# Patient Record
Sex: FEMALE | Race: WHITE | Employment: UNEMPLOYED | URBAN - METROPOLITAN AREA
[De-identification: names, ages, dates, MRNs, and addresses within clinical notes are randomized per-mention and may not be internally consistent; named-entity substitution may affect disease eponyms.]

---

## 2024-04-01 ENCOUNTER — OFFICE VISIT (OUTPATIENT)
Dept: URGENT CARE | Facility: CLINIC | Age: 44
End: 2024-04-01
Payer: COMMERCIAL

## 2024-04-01 VITALS
SYSTOLIC BLOOD PRESSURE: 121 MMHG | HEART RATE: 72 BPM | OXYGEN SATURATION: 98 % | RESPIRATION RATE: 18 BRPM | DIASTOLIC BLOOD PRESSURE: 82 MMHG | TEMPERATURE: 98.5 F

## 2024-04-01 DIAGNOSIS — N64.52 BREAST DISCHARGE: Primary | ICD-10-CM

## 2024-04-01 PROCEDURE — 87205 SMEAR GRAM STAIN: CPT

## 2024-04-01 PROCEDURE — 87070 CULTURE OTHR SPECIMN AEROBIC: CPT

## 2024-04-01 PROCEDURE — 99213 OFFICE O/P EST LOW 20 MIN: CPT | Performed by: NURSE PRACTITIONER

## 2024-04-01 PROCEDURE — 87185 SC STD ENZYME DETCJ PER NZM: CPT

## 2024-04-01 PROCEDURE — 87075 CULTR BACTERIA EXCEPT BLOOD: CPT

## 2024-04-01 RX ORDER — SULFAMETHOXAZOLE AND TRIMETHOPRIM 800; 160 MG/1; MG/1
1 TABLET ORAL 2 TIMES DAILY
Qty: 20 TABLET | Refills: 0 | Status: SHIPPED | OUTPATIENT
Start: 2024-04-01 | End: 2024-04-08 | Stop reason: HOSPADM

## 2024-04-01 NOTE — PROGRESS NOTES
Subjective   Patient ID: Mayelin Cook is a 43 y.o. female.    Patient presents with complaints of right nipple discharge for over a year she has seen her PCP, GYN and specialist they informed her they thought it was extra skin coming out of the areola and gave her 2 mammograms which were normal but no antibiotics, no other wound cultures or anything were completed.  Patient did of note have a bacterial I&D for staph infection to the outer right breast years ago.  That is healed.  Denies any fever, chills, chest pain, shortness breath, nausea or vomiting.          The following portions of the chart were reviewed this encounter and updated as appropriate:         Review of Systems   All other systems reviewed and are negative.    Objective   Physical Exam  Vitals and nursing note reviewed.   Constitutional:       General: She is not in acute distress.     Appearance: Normal appearance. She is not toxic-appearing.   HENT:      Head: Normocephalic.      Right Ear: External ear normal.      Left Ear: External ear normal.      Nose: Nose normal.      Mouth/Throat:      Mouth: Mucous membranes are moist.      Pharynx: No oropharyngeal exudate or posterior oropharyngeal erythema.   Eyes:      Extraocular Movements: Extraocular movements intact.   Cardiovascular:      Rate and Rhythm: Normal rate and regular rhythm.      Heart sounds: Normal heart sounds.   Pulmonary:      Effort: Pulmonary effort is normal.      Breath sounds: Normal breath sounds. No wheezing.   Chest:      Comments: Lolri female chaperone in room at all times during examination.  The right breast does seem asymmetrical as opposed to the left, there is no erythema, there is no warmth on palpation.  Patient was able to express a white discharge from the nipple area this was cultured  Musculoskeletal:         General: Normal range of motion.      Cervical back: Normal range of motion and neck supple.   Skin:     Capillary Refill: Capillary refill takes less  than 2 seconds.   Neurological:      General: No focal deficit present.      Mental Status: She is alert and oriented to person, place, and time.   Psychiatric:         Mood and Affect: Mood normal.         Behavior: Behavior normal.         Thought Content: Thought content normal.       Procedures    Assessment/Plan   Diagnoses and all orders for this visit:  Breast discharge  -     BI US breast complete right; Future  -     sulfamethoxazole-trimethoprim (Bactrim DS) 800-160 mg tablet; Take 1 tablet by mouth 2 times a day for 10 days.  -     Tissue/Wound Culture/Smear  Above plan of care was reviewed with the patient, all questions were answered, through shared decision making the patient agrees with this plan of care.    Red flags for strict return precaution have been reviewed with the patient and or patient gaurdian, patient is alert, oriented and non-toxic appearing, has decision making capabilities and agrees with the plan of care through shared decision making at this time. Current diagnosis, any medication changes, lab or radiologic results have been reviewed with the patient at the time of visit. If symptoms do not improve, or worsen, patient is to follow up with PCP or report to the emergency room.   Patient is alert and oriented x3 vital signs stable nontoxic-appearing and has decision-making capabilities.    Please note that the majority this note was made with Dragon speaking software there may be grammatical errors secondary to the speaking program.      Patient disposition: Home

## 2024-04-04 LAB
B-LACTAMASE ORGANISM ISLT: POSITIVE
BACTERIA SPEC CULT: ABNORMAL
BACTERIA SPEC CULT: ABNORMAL
GRAM STN SPEC: ABNORMAL
GRAM STN SPEC: ABNORMAL

## 2024-04-05 ENCOUNTER — HOSPITAL ENCOUNTER (OUTPATIENT)
Dept: RADIOLOGY | Facility: CLINIC | Age: 44
Discharge: HOME | End: 2024-04-05
Payer: COMMERCIAL

## 2024-04-05 ENCOUNTER — HOSPITAL ENCOUNTER (OUTPATIENT)
Dept: RADIOLOGY | Facility: EXTERNAL LOCATION | Age: 44
Discharge: HOME | End: 2024-04-05

## 2024-04-05 DIAGNOSIS — N64.52 NIPPLE DISCHARGE: ICD-10-CM

## 2024-04-05 DIAGNOSIS — N64.52 BREAST DISCHARGE: ICD-10-CM

## 2024-04-05 PROCEDURE — 76642 ULTRASOUND BREAST LIMITED: CPT | Mod: RIGHT SIDE | Performed by: STUDENT IN AN ORGANIZED HEALTH CARE EDUCATION/TRAINING PROGRAM

## 2024-04-05 PROCEDURE — 76642 ULTRASOUND BREAST LIMITED: CPT | Mod: RT

## 2024-04-05 PROCEDURE — 76982 USE 1ST TARGET LESION: CPT | Mod: RT

## 2024-04-06 NOTE — PROGRESS NOTES
Mayelin Cook female   1980 43 y.o.   [id number]      Chief Complaint    New Patient Visit          [facility]  Mayelin Cook is a 43 y.o.   female referred by Kaushal Sloan from Urgent Care to the [facility] for abnormal breast imaging.  Patient complains of right nipple erythema and swelling with underlying lump and yellow nipple discharge.  She is currently on Bactrim. This morning while exercising she has large amount of yellow fluid come out from near nipple where previous nipple piercing was.  She smoke 2 cigars daily.  She has history of right breast incision and drainage for staph infection several years ago.    BREAST IMAGIN2024 right breast ultrasound, BI-RADS Category 3 right subareolar fluid collection consistent with developing abscess, short-term follow-up ultrasound is recommended. 2024 bilateral screening mammogram at [facility], BI-RADS Category 1,    REPRODUCTIVE HISTORY: menarche age 13, , first birth age 19, perimenopausal, on HRT, scattered breast tissue     FAMILY CANCER HISTORY: None      REVIEW OF SYSTEMS    Constitutional:  Negative for appetite change, fatigue, fever and unexpected weight change.   HENT:  Negative for ear pain, hearing loss, nosebleeds, sore throat and trouble swallowing.    Eyes:  Negative for discharge, itching and visual disturbance.   Respiratory:  Negative for cough, chest tightness and shortness of breath.    Cardiovascular:  Negative for chest pain, palpitations and leg swelling.   Breast: as indicated in HPI  Gastrointestinal:  Negative for abdominal pain, constipation, diarrhea and nausea.   Endocrine: Negative for cold intolerance and heat intolerance.   Genitourinary:  Negative for dysuria, frequency, hematuria, pelvic pain and vaginal bleeding.   Musculoskeletal:  Negative for arthralgias, back pain, gait problem, joint swelling and myalgias.   Skin:  Negative for color change and rash.   Allergic/Immunologic: Negative for environmental  allergies and food allergies.   Neurological:  Negative for dizziness, tremors, speech difficulty, weakness, numbness and headaches.   Hematological:  Does not bruise/bleed easily.   Psychiatric/Behavioral:  Negative for agitation, dysphoric mood and sleep disturbance. The patient is not nervous/anxious.         MEDICATIONS  Current Outpatient Medications   Medication Instructions    acetaminophen (TYLENOL) 1,000 mg, oral, Every 6 hours    doxycycline (VIBRA-TABS) 100 mg, oral, 2 times daily, Take with a full glass of water and do not lie down for at least 30 minutes after.    estradiol (Vivelle-DOT) 0.0375 mg/24 hr 1 patch, transdermal    ibuprofen 600 mg tablet     traZODone (Desyrel) 100 mg tablet 2 tablets, oral, Nightly PRN        ALLERGIES  Allergies   Allergen Reactions    Bee Venom Protein (Honey Bee) Unknown        No past medical history on file.   No past surgical history on file.   No family history on file.       SOCIAL HISTORY      Social History     Tobacco Use    Smoking status: Not on file    Smokeless tobacco: Not on file   Substance Use Topics    Alcohol use: Not on file        VITALS  Vitals:    04/08/24 1008   BP: 110/60   Pulse: 66   Temp: 37 °C (98.6 °F)        PHYSICAL EXAM  Patient is alert and oriented x3, with appropriate mood. The gait is steady and hand grasps are equal. Sclera clear. The breasts are nearly symmetrical. The right breast subareolar is 2cm thickening, to right of nipple is hole from piercing with thick yellow fluid. The right superolateral breast with hole like scar from previous infection. The left tissue is soft without palpable abnormalities, discrete nodules or masses. The skin and nipples appear normal. There is no cervical, supraclavicular, or axillary lymphadenopathy palpable. Heart rate and rhythm normal, S1 and S2 appreciated. The lungs are clear bilaterally. Abdomen is soft & non-tender.    Physical Exam  Chest:              IMAGING      Time was spent viewing  digital images of the radiology testing with the patient. I explained the results in depth, along with suggested explanation for follow up recommendations based on the testing results.          ORDERS  Orders Placed This Encounter   Procedures    BI US breast limited right     Standing Status:   Future     Standing Expiration Date:   6/8/2025     Order Specific Question:   Reason for exam:     Answer:   right     Order Specific Question:   Radiologist to Determine Optimal Study     Answer:   Yes     Order Specific Question:   [facility]     Answer:   Immediate     Order Specific Question:   Is this exam part of a Research Study? If Yes, link this order to the research study     Answer:   No          ASSESSMENT/PLAN  1. Breast infection  doxycycline (Vibra-Tabs) 100 mg tablet    BI US breast limited right    Clinic Appointment Request             Follow up in about 1 month (around 5/8/2024).Stop Bactrim. Start Doxycyline 100mg twice daily, use warm water soaks  3 times daily to right nipple. Stop smoking      Leonor Cortez, APRN-CNP  [facility]

## 2024-04-08 ENCOUNTER — HOSPITAL ENCOUNTER (OUTPATIENT)
Dept: RADIOLOGY | Facility: CLINIC | Age: 44
Discharge: HOME | End: 2024-04-08
Payer: COMMERCIAL

## 2024-04-08 ENCOUNTER — OFFICE VISIT (OUTPATIENT)
Dept: SURGICAL ONCOLOGY | Facility: CLINIC | Age: 44
End: 2024-04-08
Payer: COMMERCIAL

## 2024-04-08 VITALS
DIASTOLIC BLOOD PRESSURE: 60 MMHG | BODY MASS INDEX: 27.23 KG/M2 | SYSTOLIC BLOOD PRESSURE: 110 MMHG | HEART RATE: 66 BPM | HEIGHT: 62 IN | TEMPERATURE: 98.6 F | WEIGHT: 148 LBS

## 2024-04-08 DIAGNOSIS — R92.8 OTHER ABNORMAL AND INCONCLUSIVE FINDINGS ON DIAGNOSTIC IMAGING OF BREAST: ICD-10-CM

## 2024-04-08 DIAGNOSIS — N61.0 BREAST INFECTION: Primary | ICD-10-CM

## 2024-04-08 PROCEDURE — 99203 OFFICE O/P NEW LOW 30 MIN: CPT | Performed by: NURSE PRACTITIONER

## 2024-04-08 PROCEDURE — 99213 OFFICE O/P EST LOW 20 MIN: CPT | Performed by: NURSE PRACTITIONER

## 2024-04-08 RX ORDER — TRAZODONE HYDROCHLORIDE 100 MG/1
2 TABLET ORAL NIGHTLY PRN
COMMUNITY
Start: 2021-11-04

## 2024-04-08 RX ORDER — DOXYCYCLINE HYCLATE 100 MG
100 TABLET ORAL 2 TIMES DAILY
Qty: 28 TABLET | Refills: 0 | Status: SHIPPED | OUTPATIENT
Start: 2024-04-08 | End: 2024-04-22

## 2024-04-08 RX ORDER — ACETAMINOPHEN 500 MG
1000 TABLET ORAL EVERY 6 HOURS
COMMUNITY
Start: 2023-09-19

## 2024-04-08 RX ORDER — IBUPROFEN 600 MG/1
TABLET ORAL
COMMUNITY
Start: 2023-09-19 | End: 2024-05-29 | Stop reason: ALTCHOICE

## 2024-04-08 RX ORDER — ESTRADIOL 0.04 MG/D
1 FILM, EXTENDED RELEASE TRANSDERMAL
COMMUNITY
Start: 2024-02-29 | End: 2025-02-28

## 2024-04-08 ASSESSMENT — PAIN SCALES - GENERAL: PAINLEVEL: 7

## 2024-04-09 ENCOUNTER — LAB (OUTPATIENT)
Dept: LAB | Facility: LAB | Age: 44
End: 2024-04-09
Payer: COMMERCIAL

## 2024-04-09 ENCOUNTER — OFFICE VISIT (OUTPATIENT)
Dept: PRIMARY CARE | Facility: CLINIC | Age: 44
End: 2024-04-09
Payer: COMMERCIAL

## 2024-04-09 VITALS
WEIGHT: 153 LBS | OXYGEN SATURATION: 97 % | HEART RATE: 81 BPM | HEIGHT: 62 IN | TEMPERATURE: 98.2 F | DIASTOLIC BLOOD PRESSURE: 68 MMHG | BODY MASS INDEX: 28.16 KG/M2 | SYSTOLIC BLOOD PRESSURE: 112 MMHG

## 2024-04-09 DIAGNOSIS — R20.2 PARESTHESIA OF ARM: Primary | ICD-10-CM

## 2024-04-09 DIAGNOSIS — R68.89 COLD INTOLERANCE: ICD-10-CM

## 2024-04-09 DIAGNOSIS — N64.52 NIPPLE DISCHARGE: ICD-10-CM

## 2024-04-09 DIAGNOSIS — G56.03 BILATERAL CARPAL TUNNEL SYNDROME: ICD-10-CM

## 2024-04-09 LAB — TSH SERPL-ACNC: 1.26 MIU/L (ref 0.44–3.98)

## 2024-04-09 PROCEDURE — L3908 WHO COCK-UP NONMOLDE PRE OTS: HCPCS | Performed by: PHYSICIAN ASSISTANT

## 2024-04-09 PROCEDURE — 99214 OFFICE O/P EST MOD 30 MIN: CPT | Performed by: FAMILY MEDICINE

## 2024-04-09 PROCEDURE — 36415 COLL VENOUS BLD VENIPUNCTURE: CPT

## 2024-04-09 PROCEDURE — 84443 ASSAY THYROID STIM HORMONE: CPT

## 2024-04-09 NOTE — PROGRESS NOTES
"Subjective   Patient ID: Mayelin Cook is a 43 y.o. female who presents for Establish Care.    HPI     States her hands and bilateral arms fall asleep and the pain wakes her up out of her sleep. The pain is usually from the elbows down, but sometimes can be down the whole arm. 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 Does not happen with jogging, walking - primarily only happens with lying down. She denies any neck or shoulder pain or Hx of injuries to the neck or shoulder. States that her pain was exacerbated by doing heavy-duty bootcamps and powerlifting to work out, but she stopped around 90 days ago and replaced that with yoga and her Sx have been happening less often. She is woken up at least 3 days a week multiple times at night by a tingling pain. States that up until 3 years ago, she was a  \"her whole life.\"     Pt saw Satish Sloan through the [facility] 4/1/24 for nipple discharge. Was referred to breast surgery - saw JUNIE Cortez and is following up with them. They did an US, previously did a mammo in February.     Pt also brought up that her right eye is twitching and is smaller than the other. Has been ongoing x 8-9 months. She was referred to an eye doctor, but then she was told to start at [facility] and did not go. She has not seen an eye doctor. 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 States that she \"is having trouble losing the last 10 lbs to make her at a comfortable weight.\" Has done intermittent fasting, eating healthy, and working out consistently and the weight won't come off. She also admits to cold intolerance. She did have very brittle hair, and has spent a lot of time and money fixing it. No brittle nails. She denies significant fatigue.     Review of Systems   All other systems reviewed and are negative.      Objective   /68   Pulse 81   Temp 36.8 °C (98.2 °F)   Ht 1.575 m (5' " "2\")   Wt 69.4 kg (153 lb)   SpO2 97%   BMI 27.98 kg/m²     Physical Exam  Vitals reviewed.   Constitutional:       General: She is awake.      Appearance: Normal appearance. She is well-developed.   HENT:      Head: Normocephalic and atraumatic.   Cardiovascular:      Rate and Rhythm: Normal rate.   Pulmonary:      Effort: Pulmonary effort is normal.   Musculoskeletal:      Cervical back: Full passive range of motion without pain.      Right lower leg: No edema.      Left lower leg: No edema.   Skin:     General: Skin is warm and dry.      Findings: No lesion or rash.   Neurological:      General: No focal deficit present.      Mental Status: She is alert and oriented to person, place, and time.      Cranial Nerves: No facial asymmetry.      Motor: Motor function is intact.      Gait: Gait is intact.   Psychiatric:         Attention and Perception: Attention normal.         Mood and Affect: Mood and affect normal.         Assessment/Plan   Diagnoses and all orders for this visit:  Cold intolerance  -     TSH with reflex to Free T4 if abnormal; Future      - 2 wrist extension splints provided for pt today to wear at night   - TSH drawn due to eye concerns per pt request, though she was previously told by other PCP that this would generally be a bilateral issue and not unilateral. Last TSH >1 year ago and was normal; however, this was prior to Sx starting. Will consider referral to [facility] if normal for further evaluation.   - Continue follow up with breast surgeon regarding nipple discharge.  - Pt to follow up if no improvement with 1 month night splints or if worsening and when she needs medication refills       "

## 2024-05-08 ENCOUNTER — APPOINTMENT (OUTPATIENT)
Dept: PRIMARY CARE | Facility: CLINIC | Age: 44
End: 2024-05-08
Payer: COMMERCIAL

## 2024-05-09 NOTE — PROGRESS NOTES
Mayelin Cook female   1980 43 y.o.   [id number]      Chief Complaint    Follow-up            [facility]  Mayelin Cook is a 43 y.o.   female [facility] the [facility]  in follow up for right breast infection. Patient had complaints of right nipple erythema and swelling with underlying lump and yellow nipple discharge.  She was on Bactrim then doxycycline. She has near.  She smoke 2 cigars daily.  She has history of right breast incision and drainage for staph infection several years ago.    BREAST IMAGIN2024 right breast ultrasound, BI-RADS Category 3 right subareolar fluid collection consistent with developing abscess, short-term follow-up ultrasound is recommended. 2024 bilateral screening mammogram at [place], BI-RADS Category 1.    REPRODUCTIVE HISTORY: menarche age 13, , first birth age 19, perimenopausal, on HRT, scattered breast tissue     FAMILY CANCER HISTORY: None      REVIEW OF SYSTEMS    Constitutional:  Negative for appetite change, fatigue, fever and unexpected weight change.   HENT:  Negative for ear pain, hearing loss, nosebleeds, sore throat and trouble swallowing.    Eyes:  Negative for discharge, itching and visual disturbance.   Respiratory:  Negative for cough, chest tightness and shortness of breath.    Cardiovascular:  Negative for chest pain, palpitations and leg swelling.   Breast: as indicated in HPI  Gastrointestinal:  Negative for abdominal pain, constipation, diarrhea and nausea.   Endocrine: Negative for cold intolerance and heat intolerance.   Genitourinary:  Negative for dysuria, frequency, hematuria, pelvic pain and vaginal bleeding.   Musculoskeletal:  Negative for arthralgias, back pain, gait problem, joint swelling and myalgias.   Skin:  Negative for color change and rash.   Allergic/Immunologic: Negative for environmental allergies and food allergies.   Neurological:  Negative for dizziness, tremors, speech difficulty, weakness, numbness and headaches.    Hematological:  Does not bruise/bleed easily.   Psychiatric/Behavioral:  Negative for agitation, dysphoric mood and sleep disturbance. The patient is not nervous/anxious.         MEDICATIONS  Current Outpatient Medications   Medication Instructions    acetaminophen (TYLENOL) 1,000 mg, oral, Every 6 hours    estradiol (Vivelle-DOT) 0.0375 mg/24 hr 1 patch, transdermal    ibuprofen 600 mg tablet     traZODone (Desyrel) 100 mg tablet 2 tablets, oral, Nightly PRN        ALLERGIES  Allergies   Allergen Reactions    Bee Venom Protein (Honey Bee) Unknown        No past medical history on file.   Past Surgical History:   Procedure Laterality Date    ABCESS DRAINAGE      HYSTERECTOMY        Family History   Problem Relation Name Age of Onset    No Known Problems Mother      No Known Problems Father            SOCIAL HISTORY      Social History     Tobacco Use    Smoking status: Every Day     Types: Cigarettes    Smokeless tobacco: Never   Substance Use Topics    Alcohol use: Not on file        VITALS  Vitals:    05/10/24 1031   BP: 121/75   Pulse: 51          PHYSICAL EXAM  Patient is alert and oriented x3, with appropriate mood. The gait is steady and hand grasps are equal. Sclera clear. The breasts are nearly symmetrical. The right breast subareolar scan barely palpable thickening. The right superolateral breast with hole like scar from previous infection. The left tissue is soft without palpable abnormalities, discrete nodules or masses. The skin and nipples appear normal. There is no cervical, supraclavicular, or axillary lymphadenopathy palpable. Heart rate and rhythm normal, S1 and S2 appreciated. The lungs are clear bilaterally. Abdomen is soft & non-tender.    Physical Exam  Chest:              IMAGING  Right breast ultrasound, BI-RADS Category 2, significant improvement.    Time was spent viewing digital images of the radiology testing with the patient. I explained the results in depth, along with suggested  explanation for follow up recommendations based on the testing results.          ORDERS  Orders Placed This Encounter   Procedures    BI mammo bilateral screening tomosynthesis     Standing Status:   Future     Standing Expiration Date:   7/10/2025     Order Specific Question:   Perform a breast ultrasound if clinically indicated by Radiologist?     Answer:   Yes     Order Specific Question:   Previous Mamm performed at  location?     Answer:   Yes     Order Specific Question:   Reason for exam:     Answer:   screening     Order Specific Question:   Radiologist to Determine Optimal Study     Answer:   Yes     Order Specific Question:   Release result to [employer]     Answer:   Immediate     Order Specific Question:   Is this exam part of a Research Study? If Yes, link this order to the research study     Answer:   No     Order Specific Question:   Is the patient pregnant?     Answer:   No          ASSESSMENT/PLAN  1. Healthcare maintenance  BI mammo bilateral screening tomosynthesis      2. Breast infection  Clinic Appointment Request               Follow up Follow up as needed.  Thank you for coming to see me today at [facility]. Your clinical examination and imaging is normal. You no longer need to be seen by a surgical breast specialist. It is important to continue annual screening mammograms & clinical breast exams. Please return to see me if you have a new breast problem or abnormal mammogram. It has been a pleasure having you as a patient.     You can see your health information, review clinical summaries from office visits & test results online when you follow your health with MY  Chart, a personal health record. To sign up go to [email or web address]. If you need assistance with signing up or trouble getting into your account call [employer] Patient Line 24/7 at [phone].     My office phone number is [phone] if you need to get in touch with me or have additional  questions or problems. Thank you for choosing [facility] and trusting me as your healthcare provider. I am honored to be a provider on your health care team and I remain dedicated to helping you achieve your health goals.       JAKUB [facility]

## 2024-05-10 ENCOUNTER — OFFICE VISIT (OUTPATIENT)
Dept: SURGICAL ONCOLOGY | Facility: CLINIC | Age: 44
End: 2024-05-10
Payer: COMMERCIAL

## 2024-05-10 ENCOUNTER — HOSPITAL ENCOUNTER (OUTPATIENT)
Dept: RADIOLOGY | Facility: CLINIC | Age: 44
Discharge: HOME | End: 2024-05-10
Payer: COMMERCIAL

## 2024-05-10 VITALS
HEIGHT: 62 IN | DIASTOLIC BLOOD PRESSURE: 75 MMHG | WEIGHT: 149 LBS | BODY MASS INDEX: 27.42 KG/M2 | HEART RATE: 51 BPM | SYSTOLIC BLOOD PRESSURE: 121 MMHG

## 2024-05-10 DIAGNOSIS — N61.0 BREAST INFECTION: ICD-10-CM

## 2024-05-10 DIAGNOSIS — Z00.00 HEALTHCARE MAINTENANCE: Primary | ICD-10-CM

## 2024-05-10 PROCEDURE — 99213 OFFICE O/P EST LOW 20 MIN: CPT | Performed by: NURSE PRACTITIONER

## 2024-05-10 PROCEDURE — 76982 USE 1ST TARGET LESION: CPT | Mod: RT

## 2024-05-10 PROCEDURE — 76642 ULTRASOUND BREAST LIMITED: CPT | Mod: RT

## 2024-05-10 ASSESSMENT — PAIN SCALES - GENERAL: PAINLEVEL: 0-NO PAIN

## 2024-05-15 DIAGNOSIS — N61.0 BREAST INFECTION: Primary | ICD-10-CM

## 2024-05-15 RX ORDER — DOXYCYCLINE 100 MG/1
100 CAPSULE ORAL 2 TIMES DAILY
Qty: 28 CAPSULE | Refills: 0 | Status: SHIPPED | OUTPATIENT
Start: 2024-05-15 | End: 2024-05-29

## 2024-05-29 ENCOUNTER — OFFICE VISIT (OUTPATIENT)
Dept: PRIMARY CARE | Facility: CLINIC | Age: 44
End: 2024-05-29
Payer: COMMERCIAL

## 2024-05-29 VITALS
DIASTOLIC BLOOD PRESSURE: 60 MMHG | OXYGEN SATURATION: 98 % | HEIGHT: 62 IN | WEIGHT: 157 LBS | BODY MASS INDEX: 28.89 KG/M2 | SYSTOLIC BLOOD PRESSURE: 108 MMHG | HEART RATE: 68 BPM

## 2024-05-29 DIAGNOSIS — M54.32 LEFT SIDED SCIATICA: Primary | ICD-10-CM

## 2024-05-29 PROCEDURE — 99214 OFFICE O/P EST MOD 30 MIN: CPT | Performed by: FAMILY MEDICINE

## 2024-05-29 PROCEDURE — 96372 THER/PROPH/DIAG INJ SC/IM: CPT | Performed by: PHYSICIAN ASSISTANT

## 2024-05-29 RX ORDER — TRIAMCINOLONE ACETONIDE 40 MG/ML
40 INJECTION, SUSPENSION INTRA-ARTICULAR; INTRAMUSCULAR ONCE
Status: COMPLETED | OUTPATIENT
Start: 2024-05-29 | End: 2024-05-29

## 2024-05-29 RX ORDER — GABAPENTIN 300 MG/1
300 CAPSULE ORAL 3 TIMES DAILY PRN
Qty: 42 CAPSULE | Refills: 0 | Status: SHIPPED | OUTPATIENT
Start: 2024-05-29 | End: 2024-06-12

## 2024-05-29 RX ADMIN — TRIAMCINOLONE ACETONIDE 40 MG: 40 INJECTION, SUSPENSION INTRA-ARTICULAR; INTRAMUSCULAR at 13:44

## 2024-05-29 NOTE — PROGRESS NOTES
"Subjective   Patient ID: Mayelin Cook is a 43 y.o. female who presents for Back Pain.    HPI     C/o pain in her right low back radiating into her calf muscle. Patient is unable to fully bend over and when bending over she feels a tearing sensation in her hamstring. Pain is worse when coughing or sneezing. Pain started about six weeks ago. She initially made an appointment a few weeks ago, but then it started to subside so she canceled. Denies any known injury. States she did have muscle spasms in her gluteal muscle one week prior of when the pain started but not sure if related, then slept on a bad mattress in an AirBnB and was wondering if maybe that caused it to be worse. She was trying to continue yoga once per week - usually goes many times per week, but could not get into that. Has received three deep tissues massages. Has also tried Aleve, Motrin, applying hot/cold, Arnicare Gel. She has also been doing sciatic nerve stretches at home, all without relief. She denies any previous Dx of back issues or sciatic nerve issues. Sx are overall worsening. They started to worsen around 2.5 weeks ago. Being in one position too long aggravates her Sx - sitting or standing too long.     Review of Systems   All other systems reviewed and are negative.      Objective   /60   Pulse 68   Ht 1.575 m (5' 2\")   Wt 71.2 kg (157 lb)   SpO2 98%   BMI 28.72 kg/m²     Physical Exam  Vitals reviewed.   Constitutional:       General: She is awake.      Appearance: Normal appearance. She is well-developed.   HENT:      Head: Normocephalic and atraumatic.   Cardiovascular:      Rate and Rhythm: Normal rate.   Pulmonary:      Effort: Pulmonary effort is normal.   Musculoskeletal:      Cervical back: Full passive range of motion without pain.      Right lower leg: No edema.      Left lower leg: No edema.   Skin:     General: Skin is warm and dry.      Findings: No lesion or rash.   Neurological:      General: No focal deficit " present.      Mental Status: She is alert and oriented to person, place, and time.      Cranial Nerves: No facial asymmetry.      Motor: Motor function is intact.      Gait: Gait is intact.   Psychiatric:         Attention and Perception: Attention normal.         Mood and Affect: Mood and affect normal.         Assessment/Plan   Diagnoses and all orders for this visit:  Left sided sciatica  -     gabapentin (Neurontin) 300 mg capsule; Take 1 capsule (300 mg) by mouth 3 times a day as needed (sciatica) for up to 14 days.  -     triamcinolone acetonide (Kenalog-40) injection 40 mg  -     Referral to Physical Therapy; Future

## 2024-06-03 ENCOUNTER — PATIENT MESSAGE (OUTPATIENT)
Dept: PRIMARY CARE | Facility: CLINIC | Age: 44
End: 2024-06-03
Payer: COMMERCIAL

## 2024-06-03 ENCOUNTER — APPOINTMENT (OUTPATIENT)
Dept: SURGICAL ONCOLOGY | Facility: CLINIC | Age: 44
End: 2024-06-03
Payer: COMMERCIAL

## 2024-06-03 DIAGNOSIS — M51.26 LUMBAR HERNIATED DISC: Primary | ICD-10-CM

## 2024-06-04 ENCOUNTER — OFFICE VISIT (OUTPATIENT)
Dept: SURGICAL ONCOLOGY | Facility: CLINIC | Age: 44
End: 2024-06-04
Payer: COMMERCIAL

## 2024-06-04 VITALS
WEIGHT: 157 LBS | TEMPERATURE: 99.6 F | SYSTOLIC BLOOD PRESSURE: 104 MMHG | BODY MASS INDEX: 28.89 KG/M2 | HEIGHT: 62 IN | HEART RATE: 66 BPM | DIASTOLIC BLOOD PRESSURE: 61 MMHG

## 2024-06-04 DIAGNOSIS — N61.0 BREAST INFECTION: Primary | ICD-10-CM

## 2024-06-04 PROCEDURE — 99213 OFFICE O/P EST LOW 20 MIN: CPT | Performed by: NURSE PRACTITIONER

## 2024-06-04 RX ORDER — METHYLPREDNISOLONE 4 MG/1
TABLET ORAL
COMMUNITY
Start: 2024-06-03 | End: 2024-06-10

## 2024-06-04 RX ORDER — OXYCODONE AND ACETAMINOPHEN 5; 325 MG/1; MG/1
1 TABLET ORAL EVERY 6 HOURS PRN
COMMUNITY
Start: 2024-06-03 | End: 2024-06-08

## 2024-06-04 RX ORDER — LIDOCAINE 560 MG/1
1 PATCH PERCUTANEOUS; TOPICAL; TRANSDERMAL
COMMUNITY
Start: 2024-06-03

## 2024-06-04 ASSESSMENT — PAIN SCALES - GENERAL: PAINLEVEL: 5

## 2024-06-04 NOTE — PROGRESS NOTES
Mayelin Cook female   1980 43 y.o.   [id number]      Chief Complaint    Follow-up              [facility]  Mayelin Cook is a 43 y.o.   female [facility]  in follow up for right breast infection. Patient had complaints of right nipple erythema and swelling with underlying lump and yellow nipple discharge.  She was on Bactrim then doxycycline. Symptoms resolved then flared up mid May, She completed another round of doxycycline and no longer feel lump but has residual tenderness. She smoke 2 cigars daily.  She has history of right breast incision and drainage for staph infection several years ago.    She was in ED yesterday after severe back pain with immobility. She is seeing a spinal specialist next week.       BREAST IMAGIN/10/2024 right breast ultrasound, BI-RADS Category 2, improved right breast abscess, clinical follow up. 2024 right breast ultrasound, BI-RADS Category 3 right subareolar fluid collection consistent with developing abscess, short-term follow-up ultrasound is recommended. 2024 bilateral screening mammogram at [facility], BI-RADS Category 1.      REPRODUCTIVE HISTORY: menarche age 13, , first birth age 19, perimenopausal, on HRT, scattered breast tissue     FAMILY CANCER HISTORY: None      REVIEW OF SYSTEMS    Constitutional:  Negative for appetite change, fatigue, fever and unexpected weight change.   HENT:  Negative for ear pain, hearing loss, nosebleeds, sore throat and trouble swallowing.    Eyes:  Negative for discharge, itching and visual disturbance.   Respiratory:  Negative for cough, chest tightness and shortness of breath.    Cardiovascular:  Negative for chest pain, palpitations and leg swelling.   Breast: as indicated in HPI  Gastrointestinal:  Negative for abdominal pain, constipation, diarrhea and nausea.   Endocrine: Negative for cold intolerance and heat intolerance.   Genitourinary:  Negative for dysuria, frequency, hematuria, pelvic pain and  vaginal bleeding.   Musculoskeletal:  Negative for arthralgias, back pain, gait problem, joint swelling and myalgias.   Skin:  Negative for color change and rash.   Allergic/Immunologic: Negative for environmental allergies and food allergies.   Neurological:  Negative for dizziness, tremors, speech difficulty, weakness, numbness and headaches.   Hematological:  Does not bruise/bleed easily.   Psychiatric/Behavioral:  Negative for agitation, dysphoric mood and sleep disturbance. The patient is not nervous/anxious.         MEDICATIONS  Current Outpatient Medications   Medication Instructions    acetaminophen (TYLENOL) 1,000 mg, oral, Every 6 hours    estradiol (Vivelle-DOT) 0.0375 mg/24 hr 1 patch, transdermal    gabapentin (NEURONTIN) 300 mg, oral, 3 times daily PRN    lidocaine 4 % patch 1 patch, transdermal, Daily RT    methylPREDNISolone (Medrol) 4 mg tablet Follow schedule on package instructions    oxyCODONE-acetaminophen (Percocet) 5-325 mg tablet 1 tablet, oral, Every 6 hours PRN    traZODone (Desyrel) 100 mg tablet 2 tablets, oral, Nightly PRN        ALLERGIES  Allergies   Allergen Reactions    Bee Venom Protein (Honey Bee) Unknown        No past medical history on file.   Past Surgical History:   Procedure Laterality Date    ABCESS DRAINAGE      HYSTERECTOMY        Family History   Problem Relation Name Age of Onset    No Known Problems Mother      No Known Problems Father            SOCIAL HISTORY      Social History     Tobacco Use    Smoking status: Every Day     Types: Cigarettes    Smokeless tobacco: Never   Substance Use Topics    Alcohol use: Not on file        VITALS  Vitals:    06/04/24 1430   BP: 104/61   Pulse: 66   Temp: 37.6 °C (99.6 °F)            PHYSICAL EXAM  Patient is alert and oriented x3, with appropriate mood. The gait is steady and hand grasps are equal. Sclera clear. The breasts are nearly symmetrical. The right breast subareolar is completely normal without evidence of infection.  The left tissue is soft without palpable abnormalities, discrete nodules or masses. The skin and nipples appear normal. There is no cervical, supraclavicular, or axillary lymphadenopathy palpable. Heart rate and rhythm normal, S1 and S2 appreciated. The lungs are clear bilaterally. Abdomen is soft & non-tender.    Physical Exam     IMAGING  N/A          ORDERS  N/A         ASSESSMENT/PLAN  1. Breast infection          Breast infection resolved         Follow up Discharge to PCP.  Thank you for coming to see me today at [place]. Your clinical examination and imaging is normal. You no longer need to be seen by a surgical breast specialist. It is important to continue annual screening mammograms & clinical breast exams. Please return to see me if you have a new breast problem or abnormal mammogram. It has been a pleasure having you as a patient.     You can see your health information, review clinical summaries from office visits & test results online when you follow your health with MY  Chart, a personal health record. To sign up go to [email or web address]. If you need assistance with signing up or trouble getting into your account call [employer] Patient Line 24/7 at [phone].     My office phone number is [phone] if you need to get in touch with me or have additional questions or problems. Thank you for choosing [facility] and trusting me as your healthcare provider. I am honored to be a provider on your health care team and I remain dedicated to helping you achieve your health goals.       Leonor Cortez, JAKUB-CNP  [facility]

## 2024-06-10 ENCOUNTER — APPOINTMENT (OUTPATIENT)
Dept: PRIMARY CARE | Facility: CLINIC | Age: 44
End: 2024-06-10
Payer: COMMERCIAL

## 2024-06-11 ENCOUNTER — APPOINTMENT (OUTPATIENT)
Dept: RADIOLOGY | Facility: CLINIC | Age: 44
End: 2024-06-11
Payer: COMMERCIAL

## 2024-06-11 ENCOUNTER — OFFICE VISIT (OUTPATIENT)
Dept: ORTHOPEDIC SURGERY | Facility: CLINIC | Age: 44
End: 2024-06-11
Payer: COMMERCIAL

## 2024-06-11 DIAGNOSIS — M54.16 LUMBAR RADICULOPATHY: Primary | ICD-10-CM

## 2024-06-11 DIAGNOSIS — M51.26 LUMBAR HERNIATED DISC: ICD-10-CM

## 2024-06-11 PROCEDURE — 99213 OFFICE O/P EST LOW 20 MIN: CPT | Performed by: PHYSICIAN ASSISTANT

## 2024-06-11 PROCEDURE — 99203 OFFICE O/P NEW LOW 30 MIN: CPT | Performed by: PHYSICIAN ASSISTANT

## 2024-06-11 RX ORDER — PREGABALIN 100 MG/1
100 CAPSULE ORAL 2 TIMES DAILY
Qty: 120 CAPSULE | Refills: 1 | Status: SHIPPED | OUTPATIENT
Start: 2024-06-11 | End: 2024-10-09

## 2024-06-11 RX ORDER — PREDNISONE 10 MG/1
TABLET ORAL DAILY
Qty: 30 TABLET | Refills: 0 | Status: SHIPPED | OUTPATIENT
Start: 2024-06-11 | End: 2024-06-20

## 2024-06-11 ASSESSMENT — PAIN - FUNCTIONAL ASSESSMENT: PAIN_FUNCTIONAL_ASSESSMENT: 0-10

## 2024-06-11 ASSESSMENT — PAIN SCALES - GENERAL: PAINLEVEL_OUTOF10: 8

## 2024-06-12 PROBLEM — N61.1 ABSCESS OF SKIN OF BREAST: Status: ACTIVE | Noted: 2017-09-29

## 2024-06-12 PROBLEM — R68.89 OTHER GENERAL SYMPTOMS AND SIGNS: Status: ACTIVE | Noted: 2024-04-09

## 2024-06-12 PROBLEM — R92.8 ABNORMAL FINDINGS ON DIAGNOSTIC IMAGING OF BREAST: Status: ACTIVE | Noted: 2024-04-08

## 2024-06-12 RX ORDER — METHYLPREDNISOLONE 4 MG/1
TABLET ORAL
COMMUNITY
Start: 2024-06-03

## 2024-06-12 RX ORDER — AMOXICILLIN AND CLAVULANATE POTASSIUM 875; 125 MG/1; MG/1
1 TABLET, FILM COATED ORAL
COMMUNITY
Start: 2024-01-15

## 2024-06-13 ENCOUNTER — OFFICE VISIT (OUTPATIENT)
Dept: PAIN MEDICINE | Facility: CLINIC | Age: 44
End: 2024-06-13
Payer: COMMERCIAL

## 2024-06-13 VITALS
WEIGHT: 155 LBS | HEIGHT: 62 IN | HEART RATE: 68 BPM | OXYGEN SATURATION: 98 % | RESPIRATION RATE: 18 BRPM | TEMPERATURE: 97.5 F | SYSTOLIC BLOOD PRESSURE: 115 MMHG | DIASTOLIC BLOOD PRESSURE: 72 MMHG | BODY MASS INDEX: 28.52 KG/M2

## 2024-06-13 DIAGNOSIS — M51.26 LUMBAR HERNIATED DISC: ICD-10-CM

## 2024-06-13 DIAGNOSIS — M54.17 LUMBOSACRAL RADICULOPATHY: Primary | ICD-10-CM

## 2024-06-13 PROCEDURE — 99214 OFFICE O/P EST MOD 30 MIN: CPT | Performed by: ANESTHESIOLOGY

## 2024-06-13 PROCEDURE — 99204 OFFICE O/P NEW MOD 45 MIN: CPT | Performed by: ANESTHESIOLOGY

## 2024-06-13 RX ORDER — OXYCODONE AND ACETAMINOPHEN 5; 325 MG/1; MG/1
1 TABLET ORAL EVERY 6 HOURS PRN
Qty: 10 TABLET | Refills: 0 | Status: SHIPPED | OUTPATIENT
Start: 2024-06-13 | End: 2024-06-16

## 2024-06-13 RX ORDER — LIDOCAINE 50 MG/G
1 PATCH TOPICAL DAILY
Qty: 30 PATCH | Refills: 0 | Status: SHIPPED | OUTPATIENT
Start: 2024-06-13

## 2024-06-13 SDOH — ECONOMIC STABILITY: FOOD INSECURITY: WITHIN THE PAST 12 MONTHS, YOU WORRIED THAT YOUR FOOD WOULD RUN OUT BEFORE YOU GOT MONEY TO BUY MORE.: NEVER TRUE

## 2024-06-13 SDOH — ECONOMIC STABILITY: FOOD INSECURITY: WITHIN THE PAST 12 MONTHS, THE FOOD YOU BOUGHT JUST DIDN'T LAST AND YOU DIDN'T HAVE MONEY TO GET MORE.: NEVER TRUE

## 2024-06-13 ASSESSMENT — LIFESTYLE VARIABLES
HOW OFTEN DO YOU HAVE A DRINK CONTAINING ALCOHOL: 2-4 TIMES A MONTH
HOW OFTEN DO YOU HAVE SIX OR MORE DRINKS ON ONE OCCASION: NEVER
SKIP TO QUESTIONS 9-10: 1
HOW MANY STANDARD DRINKS CONTAINING ALCOHOL DO YOU HAVE ON A TYPICAL DAY: 1 OR 2
AUDIT-C TOTAL SCORE: 2
TOTAL SCORE: 1

## 2024-06-13 ASSESSMENT — PATIENT HEALTH QUESTIONNAIRE - PHQ9
2. FEELING DOWN, DEPRESSED OR HOPELESS: NOT AT ALL
1. LITTLE INTEREST OR PLEASURE IN DOING THINGS: NOT AT ALL
SUM OF ALL RESPONSES TO PHQ9 QUESTIONS 1 AND 2: 0

## 2024-06-13 ASSESSMENT — PAIN SCALES - GENERAL
PAINLEVEL: 6
PAINLEVEL_OUTOF10: 8

## 2024-06-13 ASSESSMENT — COLUMBIA-SUICIDE SEVERITY RATING SCALE - C-SSRS
1. IN THE PAST MONTH, HAVE YOU WISHED YOU WERE DEAD OR WISHED YOU COULD GO TO SLEEP AND NOT WAKE UP?: NO
6. HAVE YOU EVER DONE ANYTHING, STARTED TO DO ANYTHING, OR PREPARED TO DO ANYTHING TO END YOUR LIFE?: NO
2. HAVE YOU ACTUALLY HAD ANY THOUGHTS OF KILLING YOURSELF?: NO

## 2024-06-13 ASSESSMENT — ENCOUNTER SYMPTOMS
EYE PAIN: 0
LOSS OF SENSATION IN FEET: 1
ABDOMINAL PAIN: 0
BLOOD IN STOOL: 0
FEVER: 0
FREQUENCY: 1
WEAKNESS: 0
OCCASIONAL FEELINGS OF UNSTEADINESS: 0
DEPRESSION: 0
ADENOPATHY: 0
SHORTNESS OF BREATH: 0
NUMBNESS: 1
BACK PAIN: 1

## 2024-06-13 ASSESSMENT — PAIN DESCRIPTION - DESCRIPTORS: DESCRIPTORS: ACHING;BURNING;SHOOTING;RADIATING

## 2024-06-13 ASSESSMENT — PAIN - FUNCTIONAL ASSESSMENT: PAIN_FUNCTIONAL_ASSESSMENT: 0-10

## 2024-06-13 NOTE — PROGRESS NOTES
"HPI:  Mayelin Cook is a 43 y.o. female who presents to the spine clinic for evaluation of LLE pain.     Repots approx 8 weeks of pain, significantly worsened over the past 3 weeks. Denies acute injury or inciting event. She has minimal low back pain.     Reports pain in the left leg for the knee to the lateral calf and into the toes. Pain increases with sitting. Reports decreased ROM in the low back and left leg - she is normally very active however is now unable to do Yoga or her other normal activities.     She was seen in the emergency department on 06/03/24 at [facility] after her pain became so severe she called an ambulance to take her to the hospital. She was given oxycodone, lidocaine patches, and Medrolpack which was somewhat helpful.   She was given a trial of Gabapentin however did not tolerate well and discontinued after 2 weeks.    She has PT scheduled to start July 16. She has been getting sports massages x 4 in the meantime.     She did have an MRI scan done at [facility] on 06/03/24, however she did not bring a copy for review. I was able to see the report stating \" At the L5-S1 level, there is a left subarticular disc protrusion which contacts and flattens the traversing left S1 nerve root \".     ROS:  Reviewed on EMR and patient intake sheet.    PMH/SH:  Reviewed on EMR and patient intake sheet.    Exam:  Physical Exam  Spine Musculoskeletal Exam    Appears in pain, NAD  Pleasant & cooperative  BMI 28.35  normal ROM lumbar spine with rotation, flexion/extension  No paraspinal muscle spasms  Decreased sensation left S1 distribution; remaining lower extremity sensation intact  lower extremity motor 5/5 throughout  antalgic gait & station  + L SLR    Radiology:     MRI report reviewed - see HPI    Assessment and Plan:  1. Lumbar herniated disc  - Referral to Spine Surgery  - Referral to Pain Management; Future    2. Lumbar radiculopathy  - Referral to Pain Management; Future  - predniSONE " (Deltasone) 10 mg tablet; Take 5 tablets (50 mg) by mouth once daily for 2 days, THEN 4 tablets (40 mg) once daily for 2 days, THEN 3 tablets (30 mg) once daily for 2 days, THEN 2 tablets (20 mg) once daily for 2 days, THEN 1 tablet (10 mg) once daily for 2 days.  Dispense: 30 tablet; Refill: 0  - pregabalin (Lyrica) 100 mg capsule; Take 1 capsule (100 mg) by mouth 2 times a day.  Dispense: 120 capsule; Refill: 1    I reviewed the imaging report with Mayelin Cook in detail today. Patient was seen today for discussion and evaluation of radicular symptoms that have not improved on their own. We discussed conservative management and the patient was recommended to follow through with PT with focus on core strengthening today. I educated the patient on several lifestyle modifications that include increased walking, weight management, smoking cessation, and a routine exercise plan that includes core strengthening.     I recommended a Prednisone taper and a trial of Lyrica today to calm down acute radicular pain symptoms. I recommended OTC Tylenol/NSAIDs for pain relief after finishing the steroid taper.   Referral placed to pain management for consideration of SREEKANTH.    Patient was recommended to follow up in 4-6 weeks if their symptoms do not improve, or sooner if symptoms suddenly worsen and they can not complete PT.    Patient in agreement to plan of care. Of course Mayelin Cook is welcome to call at anytime with questions or concerns.     Kim Yip PA-C  Department of Orthopaedic Surgery    [address] [place] [address]    Voicemail: [phone]   Appts: [phone]  Fax: [phone]

## 2024-06-13 NOTE — PROGRESS NOTES
Chief Complain    New Patient Visit (For pain in mid back down to left leg in back of leg, that has been going on for 10 weeks. Is having spams. Is hard to sit down. Deny neck and back surgery. Had MRI done at [facility]. Currently not taking anything for pain. Was sent home from ER with lidocaine patches, steroids and percocet. Was started on pregablin from ortho, Was reffered by ORTHO)    History Of Present Illness  Mayelin Cook is a 43 y.o. female here for evaluation of left buttock, radiating to left calf and foot. The patient has been experiencing these symptoms for last 2 and half month(s). The patient describes the pain as aching, radiating, shooting, burning. The patient's current pain score is 8 on a scale from 0-10. The pain is worsened by prolonged sitting and is alleviated by position change. Since the start of the symptoms the pain has been worse.    The patient denies any fever, chills, weight loss, weakness, numbness, bladder/ bowel incontinence, history of cancer, history of IV drug abuse, recent trauma.      Past Medical History  She has no past medical history on file.    Surgical History  She has a past surgical history that includes Abscess drainage and Hysterectomy.    Social History  She reports that she has been smoking cigarettes. She has never used smokeless tobacco. She reports that she does not currently use drugs. No history on file for alcohol use.    Family History  Family History   Problem Relation Name Age of Onset    No Known Problems Mother      No Known Problems Father          Allergies  Bee venom protein (honey bee)    Review of Systems  Review of Systems   Constitutional:  Negative for fever.   HENT:  Negative for ear pain.    Eyes:  Negative for pain.   Respiratory:  Negative for shortness of breath.    Cardiovascular:  Negative for chest pain.   Gastrointestinal:  Negative for abdominal pain and blood in stool.   Endocrine: Negative for cold intolerance and heat intolerance.  "  Genitourinary:  Positive for frequency.   Musculoskeletal:  Positive for back pain.   Skin:  Negative for rash.   Allergic/Immunologic: Negative for food allergies.   Neurological:  Positive for numbness. Negative for weakness.   Hematological:  Negative for adenopathy.   Psychiatric/Behavioral:  Negative for suicidal ideas.         Physical Exam  Physical Exam  Constitutional:       Appearance: Normal appearance.   HENT:      Head: Normocephalic and atraumatic.   Eyes:      Extraocular Movements: Extraocular movements intact.   Cardiovascular:      Rate and Rhythm: Normal rate and regular rhythm.   Pulmonary:      Effort: Pulmonary effort is normal.   Abdominal:      Palpations: Abdomen is soft.   Musculoskeletal:      Cervical back: Neck supple.        Legs:    Skin:     General: Skin is warm.   Neurological:      Mental Status: She is alert and oriented to person, place, and time.      Motor: Motor function is intact.      Deep Tendon Reflexes:      Reflex Scores:       Patellar reflexes are 2+ on the right side and 2+ on the left side.       Achilles reflexes are 2+ on the right side and 0 on the left side.  Psychiatric:         Mood and Affect: Mood normal.         Behavior: Behavior normal.           Last Recorded Vitals  Blood pressure 115/72, pulse 68, temperature 36.4 °C (97.5 °F), resp. rate 18, height 1.575 m (5' 2\"), weight 70.3 kg (155 lb), SpO2 98%.    Reviewed Images  Reviewed and independently interpreted MRI Lumbar spine left L5-S1 paracentral disc extrusion with impingement of left S1 nerve root    Reviewed Labs  CBC auto differential  Order: [id number]  Component  Ref Range & Units 10 d ago   Auto WBC  3.6 - 10.7 10*3/uL 9.5   RBC  3.80 - 5.20 10*6/uL 4.48   Hemoglobin  11.7 - 16.0 g/dL 14.6   Hematocrit  35.0 - 47.0 % 42.2   MCV  77.0 - 99.0 fL 94.2   MCH  26.0 - 34.0 pg 32.6   MCHC  30.5 - 36.0 % 34.6   RDW  11.5 - 15.0 % 13.7   Platelets  140 - 440 10*3/uL 221   MPV  9.0 - 12.7 fL 10.4 "   nRBC  0.0 - 2.0 /100 WBCs 0.0   Neutrophils Relative  38.0 - 82.0 % 65.4   Lymphocytes Relative  15.0 - 45.0 % 23.9   Monocytes Relative  5.0 - 13.0 % 8.1   Eosinophils Relative  0.0 - 6.0 % 1.7   Basophils Relative  0.0 - 2.0 % 0.5   Immature Grans %  0.0 - 2.0 % 0.4   Neutrophils Absolute  1.8 - 7.5 10*3/uL 6.2   Lymphocytes Absolute  1.0 - 4.3 10*3/uL 2.3   Monocytes Absolute  0.0 - 0.9 10*3/uL 0.8   Eosinophils Absolute  0.0 - 0.5 10*3/uL 0.2   Basophils Absolute  0.0 - 0.2 10*3/uL 0.1   Immature Grans Absolute  <0.1 10*3/uL 0.0       Order: [id number]  Component  Ref Range & Units 10 d ago   SODIUM  135 - 145 mmol/L 137   POTASSIUM  3.5 - 5.1 mmol/L 4.2   CHLORIDE  98 - 107 mmol/L 104   CARBON DIOXIDE  22 - 30 mmol/L 26   UREA NITROGEN  7 - 17 mg/dL 20 High    CREATININE  0.52 - 1.04 mg/dL 0.74   GLUCOSE  70 - 100 mg/dL 103 High    CALCIUM  8.4 - 10.4 mg/dL 8.9   ANION GAP  3 - 13 mmol/L 7   eGFR  >60.0 mL/min/1.73m*2 >90.0        Assessment/Plan   Encounter Diagnoses   Name Primary?    Lumbosacral radiculopathy Yes    Lumbar herniated disc         Mayelin Cook is a 43 y.o. female here for evaluation of left-sided buttock pain radiating to left lower extremity of pain S1 distribution.  She has been experiencing the symptoms for last 2-1/2 months, for last couple of weeks the pain has increased.  She has been seen by orthopedics and has been giving a course of steroids, she is on day 2. She is also been taking Lyrica with limited benefit so far.  Review of MRI lumbar spine does reveal L5-S1 paracentral disc extrusion with impingement of left S1 nerve root which would explain her symptoms.      I would recommend continuation of steroids and Lyrica, continue with physical therapy as previously scheduled.  Strongly recommended activity modification, short prescription of Percocet was provided to the patient for acute pain.  Would also schedule her for a transforaminal epidural steroid injection if she does not  respond to oral steroids.      Discussed risk associated with narcotics including but not limited to addiction, dependence, respiratory depression, death, mental health issues, hormonal changes.  I have personally reviewed the OARRS report. I have considered the risks of abuse, dependence, addiction and diversion.      Total time spent caring for the patient today was 45 minutes. This includes time spent before the visit reviewing the chart, time spent during the visit, and time spent after the visit on documentation.        Cornelius Mesa MD

## 2024-06-13 NOTE — H&P (VIEW-ONLY)
Chief Complain    New Patient Visit (For pain in mid back down to left leg in back of leg, that has been going on for 10 weeks. Is having spams. Is hard to sit down. Deny neck and back surgery. Had MRI done at [facility]. Currently not taking anything for pain. Was sent home from ER with lidocaine patches, steroids and percocet. Was started on pregablin from ortho, Was reffered by ORTHO)    History Of Present Illness  Mayelin Cook is a 43 y.o. female here for evaluation of left buttock, radiating to left calf and foot. The patient has been experiencing these symptoms for last 2 and half month(s). The patient describes the pain as aching, radiating, shooting, burning. The patient's current pain score is 8 on a scale from 0-10. The pain is worsened by prolonged sitting and is alleviated by position change. Since the start of the symptoms the pain has been worse.    The patient denies any fever, chills, weight loss, weakness, numbness, bladder/ bowel incontinence, history of cancer, history of IV drug abuse, recent trauma.      Past Medical History  She has no past medical history on file.    Surgical History  She has a past surgical history that includes Abscess drainage and Hysterectomy.    Social History  She reports that she has been smoking cigarettes. She has never used smokeless tobacco. She reports that she does not currently use drugs. No history on file for alcohol use.    Family History  Family History   Problem Relation Name Age of Onset    No Known Problems Mother      No Known Problems Father          Allergies  Bee venom protein (honey bee)    Review of Systems  Review of Systems   Constitutional:  Negative for fever.   HENT:  Negative for ear pain.    Eyes:  Negative for pain.   Respiratory:  Negative for shortness of breath.    Cardiovascular:  Negative for chest pain.   Gastrointestinal:  Negative for abdominal pain and blood in stool.   Endocrine: Negative for cold intolerance and heat intolerance.  "  Genitourinary:  Positive for frequency.   Musculoskeletal:  Positive for back pain.   Skin:  Negative for rash.   Allergic/Immunologic: Negative for food allergies.   Neurological:  Positive for numbness. Negative for weakness.   Hematological:  Negative for adenopathy.   Psychiatric/Behavioral:  Negative for suicidal ideas.         Physical Exam  Physical Exam  Constitutional:       Appearance: Normal appearance.   HENT:      Head: Normocephalic and atraumatic.   Eyes:      Extraocular Movements: Extraocular movements intact.   Cardiovascular:      Rate and Rhythm: Normal rate and regular rhythm.   Pulmonary:      Effort: Pulmonary effort is normal.   Abdominal:      Palpations: Abdomen is soft.   Musculoskeletal:      Cervical back: Neck supple.        Legs:    Skin:     General: Skin is warm.   Neurological:      Mental Status: She is alert and oriented to person, place, and time.      Motor: Motor function is intact.      Deep Tendon Reflexes:      Reflex Scores:       Patellar reflexes are 2+ on the right side and 2+ on the left side.       Achilles reflexes are 2+ on the right side and 0 on the left side.  Psychiatric:         Mood and Affect: Mood normal.         Behavior: Behavior normal.           Last Recorded Vitals  Blood pressure 115/72, pulse 68, temperature 36.4 °C (97.5 °F), resp. rate 18, height 1.575 m (5' 2\"), weight 70.3 kg (155 lb), SpO2 98%.    Reviewed Images  Reviewed and independently interpreted MRI Lumbar spine left L5-S1 paracentral disc extrusion with impingement of left S1 nerve root    Reviewed Labs  CBC auto differential  Order: [id number]  Component  Ref Range & Units 10 d ago   Auto WBC  3.6 - 10.7 10*3/uL 9.5   RBC  3.80 - 5.20 10*6/uL 4.48   Hemoglobin  11.7 - 16.0 g/dL 14.6   Hematocrit  35.0 - 47.0 % 42.2   MCV  77.0 - 99.0 fL 94.2   MCH  26.0 - 34.0 pg 32.6   MCHC  30.5 - 36.0 % 34.6   RDW  11.5 - 15.0 % 13.7   Platelets  140 - 440 10*3/uL 221   MPV  9.0 - 12.7 fL 10.4 "   nRBC  0.0 - 2.0 /100 WBCs 0.0   Neutrophils Relative  38.0 - 82.0 % 65.4   Lymphocytes Relative  15.0 - 45.0 % 23.9   Monocytes Relative  5.0 - 13.0 % 8.1   Eosinophils Relative  0.0 - 6.0 % 1.7   Basophils Relative  0.0 - 2.0 % 0.5   Immature Grans %  0.0 - 2.0 % 0.4   Neutrophils Absolute  1.8 - 7.5 10*3/uL 6.2   Lymphocytes Absolute  1.0 - 4.3 10*3/uL 2.3   Monocytes Absolute  0.0 - 0.9 10*3/uL 0.8   Eosinophils Absolute  0.0 - 0.5 10*3/uL 0.2   Basophils Absolute  0.0 - 0.2 10*3/uL 0.1   Immature Grans Absolute  <0.1 10*3/uL 0.0       Order: [id number]  Component  Ref Range & Units 10 d ago   SODIUM  135 - 145 mmol/L 137   POTASSIUM  3.5 - 5.1 mmol/L 4.2   CHLORIDE  98 - 107 mmol/L 104   CARBON DIOXIDE  22 - 30 mmol/L 26   UREA NITROGEN  7 - 17 mg/dL 20 High    CREATININE  0.52 - 1.04 mg/dL 0.74   GLUCOSE  70 - 100 mg/dL 103 High    CALCIUM  8.4 - 10.4 mg/dL 8.9   ANION GAP  3 - 13 mmol/L 7   eGFR  >60.0 mL/min/1.73m*2 >90.0        Assessment/Plan   Encounter Diagnoses   Name Primary?    Lumbosacral radiculopathy Yes    Lumbar herniated disc         Mayelin Cook is a 43 y.o. female here for evaluation of left-sided buttock pain radiating to left lower extremity of pain S1 distribution.  She has been experiencing the symptoms for last 2-1/2 months, for last couple of weeks the pain has increased.  She has been seen by orthopedics and has been giving a course of steroids, she is on day 2. She is also been taking Lyrica with limited benefit so far.  Review of MRI lumbar spine does reveal L5-S1 paracentral disc extrusion with impingement of left S1 nerve root which would explain her symptoms.      I would recommend continuation of steroids and Lyrica, continue with physical therapy as previously scheduled.  Strongly recommended activity modification, short prescription of Percocet was provided to the patient for acute pain.  Would also schedule her for a transforaminal epidural steroid injection if she does not  respond to oral steroids.      Discussed risk associated with narcotics including but not limited to addiction, dependence, respiratory depression, death, mental health issues, hormonal changes.  I have personally reviewed the OARRS report. I have considered the risks of abuse, dependence, addiction and diversion.      Total time spent caring for the patient today was 45 minutes. This includes time spent before the visit reviewing the chart, time spent during the visit, and time spent after the visit on documentation.        Cornelius Mesa MD

## 2024-06-19 ENCOUNTER — OFFICE VISIT (OUTPATIENT)
Dept: SURGICAL ONCOLOGY | Facility: CLINIC | Age: 44
End: 2024-06-19
Payer: COMMERCIAL

## 2024-06-19 VITALS
SYSTOLIC BLOOD PRESSURE: 139 MMHG | BODY MASS INDEX: 27.26 KG/M2 | RESPIRATION RATE: 18 BRPM | WEIGHT: 149.03 LBS | DIASTOLIC BLOOD PRESSURE: 86 MMHG | OXYGEN SATURATION: 96 % | TEMPERATURE: 98.1 F | HEART RATE: 80 BPM

## 2024-06-19 DIAGNOSIS — N61.0 BREAST INFECTION: Primary | ICD-10-CM

## 2024-06-19 PROCEDURE — 99213 OFFICE O/P EST LOW 20 MIN: CPT

## 2024-06-19 RX ORDER — CLINDAMYCIN HYDROCHLORIDE 300 MG/1
300 CAPSULE ORAL 3 TIMES DAILY
Qty: 30 CAPSULE | Refills: 0 | Status: SHIPPED | OUTPATIENT
Start: 2024-06-19 | End: 2024-06-29

## 2024-06-19 ASSESSMENT — PAIN SCALES - GENERAL: PAINLEVEL: 5

## 2024-06-19 NOTE — PROGRESS NOTES
Mayelin Cook female   1980 43 y.o.   [id number]       HPI  Mayelin Cook is a 43 y.o.   female [facility] for right breast infection. Patient reports she recently completed doxycycline and the infection returned the following day. She would like to see a surgeon for this area due to frequent infection recurrences. Patient again has complaints of right nipple erythema and swelling with underlying lump and green-yellow nipple discharge.  She was on Bactrim then doxycycline. She smoke 2 cigars daily.  She has history of right breast incision and drainage for staph infection several years ago.          BREAST IMAGIN/10/2024 right breast ultrasound, BI-RADS Category 2, improved right breast abscess, clinical follow up. 2024 right breast ultrasound, BI-RADS Category 3 right subareolar fluid collection consistent with developing abscess, short-term follow-up ultrasound is recommended. 2024 bilateral screening mammogram at [facility], BI-RADS Category 1.      REPRODUCTIVE HISTORY: menarche age 13, , first birth age 19, perimenopausal, on HRT, scattered breast tissue     FAMILY CANCER HISTORY: None      REVIEW OF SYSTEMS    Constitutional:  Negative for appetite change, fatigue, fever and unexpected weight change.   HENT:  Negative for ear pain, hearing loss, nosebleeds, sore throat and trouble swallowing.    Eyes:  Negative for discharge, itching and visual disturbance.   Respiratory:  Negative for cough, chest tightness and shortness of breath.    Cardiovascular:  Negative for chest pain, palpitations and leg swelling.   Breast: as indicated in HPI  Gastrointestinal:  Negative for abdominal pain, constipation, diarrhea and nausea.   Endocrine: Negative for cold intolerance and heat intolerance.   Genitourinary:  Negative for dysuria, frequency, hematuria, pelvic pain and vaginal bleeding.   Musculoskeletal:  Negative for arthralgias, back pain, gait problem, joint  swelling and myalgias.   Skin:  Negative for color change and rash.   Allergic/Immunologic: Negative for environmental allergies and food allergies.   Neurological:  Negative for dizziness, tremors, speech difficulty, weakness, numbness and headaches.   Hematological:  Does not bruise/bleed easily.   Psychiatric/Behavioral:  Negative for agitation, dysphoric mood and sleep disturbance. The patient is not nervous/anxious.         MEDICATIONS  Current Outpatient Medications   Medication Instructions    acetaminophen (TYLENOL) 1,000 mg, oral, Every 6 hours    clindamycin (CLEOCIN) 300 mg, oral, 3 times daily    estradiol (Vivelle-DOT) 0.0375 mg/24 hr 1 patch, transdermal    lidocaine (Lidoderm) 5 % patch 1 patch, transdermal, Daily, Remove & discard patch within 12 hours or as directed by MD.    pregabalin (LYRICA) 100 mg, oral, 2 times daily    traZODone (Desyrel) 100 mg tablet 2 tablets, oral, Nightly PRN        ALLERGIES  Allergies   Allergen Reactions    Bee Venom Protein (Honey Bee) Unknown        No past medical history on file.   Past Surgical History:   Procedure Laterality Date    ABCESS DRAINAGE      HYSTERECTOMY        Family History   Problem Relation Name Age of Onset    No Known Problems Mother      No Known Problems Father            SOCIAL HISTORY      Social History     Tobacco Use    Smoking status: Every Day     Types: Cigarettes    Smokeless tobacco: Never   Substance Use Topics    Alcohol use: Not on file        VITALS  Vitals:    06/19/24 1541   BP: 139/86   Pulse: 80   Resp: 18   Temp: 36.7 °C (98.1 °F)   SpO2: 96%              PHYSICAL EXAM  Patient is alert and oriented x3, with appropriate mood. The gait is steady and hand grasps are equal. Sclera clear. The breasts are nearly symmetrical. The right breast subareolar is tender with erythema and purulent discharge. The left tissue is soft without palpable abnormalities, discrete nodules or masses. The skin and nipples appear normal. There is no  cervical, supraclavicular, or axillary lymphadenopathy palpable. Heart rate and rhythm normal, S1 and S2 appreciated. The lungs are clear bilaterally. Abdomen is soft & non-tender.    Physical Exam  Chest:              IMAGING              ASSESSMENT/PLAN  1. Breast infection  clindamycin (Cleocin) 300 mg capsule    Referral to General Surgery          Please take Clindamycin as prescribed. Referral placed to Dr. Rodríguez for management.  You can see your health information, review clinical summaries from office visits & test results online when you follow your health with MY  Chart, a personal health record. To sign up go to [email or web address]. If you need assistance with signing up or trouble getting into your account call [employer] Patient Line 24/7 at [phone].    My office phone number is [phone] if you need to get in touch with me or have additional questions or concerns. Thank you for choosing [facility] and trusting me as your healthcare provider. I look forward to seeing you again at your next office visit. I am honored to be a provider on your health care team and I remain dedicated to helping you achieve your health goals.               Meghan Mares, APRN-CNP

## 2024-06-20 NOTE — PATIENT INSTRUCTIONS
Please take Clindamycin as prescribed. Referral placed to Dr. Rodríguez for management.  You can see your health information, review clinical summaries from office visits & test results online when you follow your health with MY  Chart, a personal health record. To sign up go to [email or web address]. If you need assistance with signing up or trouble getting into your account call [employer] Patient Line 24/7 at [phone].    My office phone number is [phone] if you need to get in touch with me or have additional questions or concerns. Thank you for choosing [facility] and trusting me as your healthcare provider. I look forward to seeing you again at your next office visit. I am honored to be a provider on your health care team and I remain dedicated to helping you achieve your health goals.

## 2024-06-26 ENCOUNTER — APPOINTMENT (OUTPATIENT)
Dept: SURGERY | Facility: CLINIC | Age: 44
End: 2024-06-26
Payer: COMMERCIAL

## 2024-07-01 ENCOUNTER — EVALUATION (OUTPATIENT)
Dept: PHYSICAL THERAPY | Facility: CLINIC | Age: 44
End: 2024-07-01
Payer: COMMERCIAL

## 2024-07-01 DIAGNOSIS — M54.32 LEFT SIDED SCIATICA: Primary | ICD-10-CM

## 2024-07-01 PROCEDURE — 97161 PT EVAL LOW COMPLEX 20 MIN: CPT | Mod: GP | Performed by: PHYSICAL THERAPIST

## 2024-07-01 PROCEDURE — 97110 THERAPEUTIC EXERCISES: CPT | Mod: GP | Performed by: PHYSICAL THERAPIST

## 2024-07-01 ASSESSMENT — ENCOUNTER SYMPTOMS
LOSS OF SENSATION IN FEET: 0
OCCASIONAL FEELINGS OF UNSTEADINESS: 0
DEPRESSION: 0

## 2024-07-01 ASSESSMENT — PAIN SCALES - GENERAL: PAINLEVEL_OUTOF10: 5 - MODERATE PAIN

## 2024-07-01 ASSESSMENT — PATIENT HEALTH QUESTIONNAIRE - PHQ9
1. LITTLE INTEREST OR PLEASURE IN DOING THINGS: NOT AT ALL
SUM OF ALL RESPONSES TO PHQ9 QUESTIONS 1 AND 2: 0
2. FEELING DOWN, DEPRESSED OR HOPELESS: NOT AT ALL

## 2024-07-01 ASSESSMENT — PAIN - FUNCTIONAL ASSESSMENT: PAIN_FUNCTIONAL_ASSESSMENT: 0-10

## 2024-07-01 NOTE — PROGRESS NOTES
Physical Therapy    Physical Therapy Lumbar Spine Evaluation    Patient Name: Mayelni Cook  MRN: [id number]  Today's Date: 7/1/2024  Time Calculation  Start Time: 1007  Stop Time: 1108  Time Calculation (min): 61 min  PT Evaluation Time Entry  PT Evaluation (Low) Time Entry: 48  PT Therapeutic Procedures Time Entry  Therapeutic Exercise Time Entry: 13                 Payor: CARESWAPNIL / Plan: [employer]OURCE / Product Type: *No Product type* /     Reason for Referral: L lumbar radic  General Comment: Visit 1 of 30    Current Problem  Problem List Items Addressed This Visit    None  Visit Diagnoses         Codes    Left sided sciatica    -  Primary M54.32    Relevant Orders    Follow Up In Physical Therapy             Precautions  Precautions  Precautions Comment: None       Pain  Pain Assessment: 0-10  0-10 (Numeric) Pain Score: 5 - Moderate pain  Pain at worst: 10/10      SUBJECTIVE:   Pain location: L lumbar radic into thigh and into calf and outer toes (S1 distribution)     Worse with sitting   Went to ER due to severe pain   Had sports massage, chiropractor, cupping, acupuncture     Saw specialist and steroids and lyrica was recommended     Injection scheduled 7/3/24    Prior to this onset she was really active    Pain overall is slowly getting better    Pt to see ortho at [facility] 7/8/24    Onset: April 2024, worsening since May 2024  She started to experience mm spasms while in yoga   She kept pushing through it  She went on vacation and she was sitting. Noted spasms in her buttock   She flew to [place] and did not drive  Hx of sciatica years previously    +N/T in lateral aspect of L calf, had blister on toe and it popped and she didn't even know it because of lack of sensation     Denies foot drop     -B/B  +Cough/sneeze    Reviewed medical hx form     Imaging:  Per MD notes:  MRI lumbar spine does reveal L5-S1 paracentral disc extrusion with impingement of left S1 nerve root     Aggravating factors:  Sitting for too  "long  Mm fatigue in calf after prolonged walking     Alleviating factors:  Road kill position     Prior level of function:  Previously independent with all functional activity    Functional limitations:  See above   Dressing  Wearing socks and shoes    Home setup:  Reviewed and no concern    Work:  Works from home  Able to walk around    Patient stated goal:  Improve ROM and pain     Prior tx:  See above     Objective:    Lower Extremity ROM: (WNL unless documented below) (p=pain)    Lower Extremity Flexibility: (WNL unless documented below) (p=pain)  Flexibility  RIGHT LEFT   Quad     Hamstring  25 36   Hip flexor      Piriformis      ITB      Gastroc  Min loss Mod loss   Soleus  Min loss Mod loss     Lower Extremity Strength: (WNL unless documented below) (p=pain)   MMT 5/5 max  RIGHT LEFT   Hip Flexion 4+ 4+   Hip Extension 4 4   Hip Abduction 5 4+   Hip Adduction 4 4     Lumbar ROM:   Flexion Mod loss, increase    Extension Min loss, NE     RIGHT LEFT   Side Bend Min loss Min loss      Bev Lumbar repeated movements:   Pretest Symptoms: 4/10, NT calf and foot   RFIS NT    DAVID Increased L glute, \"tearing in calf\"    REIL  Same as above      Limited additional testing due to time     Static Testing: NT due to time       Special tests: (WNL unless documented below)    RIGHT LEFT   Slump   + even without DF foot    SLR  +   Hitesh  NT      Myotomes: (WNL unless documented below)    RIGHT LEFT   L2 Hip Flexion     L3 Knee Extension     L4 Ankle DF     L5 Great toe Ext     S1 Ankle PF/Ever  + with standing heel raise      Dermatomes: (WNL unless documented below)    RIGHT LEFT   L2 Anterior to Medial Thigh     L3 Medial Knee     L4 Medial Ankle Great Toe     L5 Dorsum of Foot   +   S1 Lateral Side of Foot  +   S2 Posterior Medial Thigh          Posture: Decreased lumbar lordosis   Joint mobility: NT due to time  Palpation: NT due to time     Outcome Measure:  Other Measures  Oswestry Disablity Index (CATERINA): " 36%      TREATMENT:  Initial evaluation completed. Issued and reviewed HEP with pt that included:  Access Code: [email or web address]  URL: [email or web address]  Date: 07/01/2024  Prepared by: Therese Billings    Exercises  - Prone Off Center Lumbar Spine (Mirrored)  - 4-6 x daily - 7 x weekly - 2-5 minutes hold  - Static Prone on Elbows  - 4-6 x daily - 7 x weekly - 2-5 minutes hold  - Seated Correct Posture  - 7 x weekly  - Heel Toe Raises with Counter Support  - 1 x daily - 7 x weekly - 2-3 sets - 10 reps  - Supine Sciatic Nerve Glide  - 1 x daily - 7 x weekly - 2-3 sets - 10 reps - 2 seconds hold    Patient Education  - Lumbar Disk Herniation    Significant time spent on educating Pt on the importance of monitoring symptoms for centralization and peripheralization with all activity and exercises.       ASSESSEMENT  The pt presents with signs and symptoms consistent with the Physical Therapy diagnosis of L lumbar radic into L5-S1 distribution. Symptoms are consistent with MRI findings.  Pt demonstrates myotomal weakness and dermatomal deficits in L5-S1 region. Limited time testing due to time spent educating pt on physiology and pathology as well as appropriate symptoms response to tx. Trial of static extension with hips off center provided. Pt program will be designed around response to HEP and continued assessment will be performed. Pt will benefit from skilled physical therapy to reduce impairments in order to return to prior level of function, reduce pain, increase strength and ROM and improve overall posture.     The physical therapy prognosis is good for the patient to achieve their goals.   The pt tolerated therapy treatment today well with no adverse effects.    Personal factors/barriers to learning that impact therapy include:  None  Clinical presentation: Stable and/or uncomplicated characteristics  Level of clinical decision making is Low    PLAN  The pt will be seen 2 time(s) a week for 5  weeks.      The pt has been educated about the risks and benefits of physical therapy including manual therapy treatments and gives consent for treatment.     The patient will benefit from physical therapy treatment to include: therapeutic exercises, therapeutic activities, neurological re-education, manual therapy, modalities, and a home exercise program.       Goals:  Active       PT Problem       Reduce pain at worst to 3/10 with all functional and recreational activity.        Start:  07/01/24    Expected End:  09/29/24            Increase by > or = 25% without increased pain to perform dressing/bathing/grooming tasks and other function tasks         Start:  07/01/24    Expected End:  09/29/24            Increase by > or = 1/2 mm grade to improve postural awareness and body mechanics to perform daily tasks without increased pain/compensation          Start:  07/01/24    Expected End:  09/29/24            Pt to have decrease in Oswestry by 10% to display increased overall function.        Start:  07/01/24    Expected End:  09/29/24            Patient will demonstrate independence in home program for support of progression       Start:  07/01/24    Expected End:  09/29/24

## 2024-07-03 ENCOUNTER — HOSPITAL ENCOUNTER (OUTPATIENT)
Dept: PAIN MEDICINE | Facility: CLINIC | Age: 44
Discharge: HOME | End: 2024-07-03
Payer: COMMERCIAL

## 2024-07-03 VITALS
OXYGEN SATURATION: 98 % | HEIGHT: 62 IN | SYSTOLIC BLOOD PRESSURE: 106 MMHG | DIASTOLIC BLOOD PRESSURE: 73 MMHG | WEIGHT: 151 LBS | BODY MASS INDEX: 27.79 KG/M2 | RESPIRATION RATE: 18 BRPM | TEMPERATURE: 98.1 F | HEART RATE: 59 BPM

## 2024-07-03 DIAGNOSIS — M54.17 LUMBOSACRAL RADICULOPATHY: ICD-10-CM

## 2024-07-03 PROCEDURE — 64483 NJX AA&/STRD TFRM EPI L/S 1: CPT | Performed by: ANESTHESIOLOGY

## 2024-07-03 PROCEDURE — 2500000004 HC RX 250 GENERAL PHARMACY W/ HCPCS (ALT 636 FOR OP/ED): Performed by: ANESTHESIOLOGY

## 2024-07-03 PROCEDURE — 2500000005 HC RX 250 GENERAL PHARMACY W/O HCPCS: Performed by: ANESTHESIOLOGY

## 2024-07-03 PROCEDURE — 2550000001 HC RX 255 CONTRASTS: Performed by: ANESTHESIOLOGY

## 2024-07-03 RX ORDER — LIDOCAINE HYDROCHLORIDE 10 MG/ML
INJECTION, SOLUTION EPIDURAL; INFILTRATION; INTRACAUDAL; PERINEURAL AS NEEDED
Status: COMPLETED | OUTPATIENT
Start: 2024-07-03 | End: 2024-07-03

## 2024-07-03 RX ORDER — DEXAMETHASONE SODIUM PHOSPHATE 10 MG/ML
INJECTION INTRAMUSCULAR; INTRAVENOUS AS NEEDED
Status: COMPLETED | OUTPATIENT
Start: 2024-07-03 | End: 2024-07-03

## 2024-07-03 ASSESSMENT — PAIN SCALES - GENERAL
PAINLEVEL_OUTOF10: 7
PAINLEVEL_OUTOF10: 0 - NO PAIN

## 2024-07-03 ASSESSMENT — PAIN DESCRIPTION - DESCRIPTORS: DESCRIPTORS: ACHING;THROBBING;BURNING

## 2024-07-03 ASSESSMENT — PAIN - FUNCTIONAL ASSESSMENT
PAIN_FUNCTIONAL_ASSESSMENT: 0-10
PAIN_FUNCTIONAL_ASSESSMENT: 0-10

## 2024-07-03 NOTE — PROCEDURES
Procedure Note     Date: 7/3/2024  OR Location: [facility] NON-OR PROCEDURES    Name: Mayelin Cook, : 1980, Age: 43 y.o., MRN: [id number], Sex: female    Diagnosis  Preprocedure diagnosis: Lumbar radiculopathy  Postprocedure diagnosis: Same    Procedures  Lumbar transforaminal epidural steroid injection    The patient was seen in the preoperative area. The risks, benefits, complications, treatment options, non-operative alternatives, expected recovery and outcomes were discussed with the patient. The patient concurred with the proposed plan, giving informed consent.          Procedure: The risks and benefits of treatment options and alternatives were discussed with the patient, and consent was obtained for a cervical epidural steroid injection. She wishes to proceed. She was placed in a prone position. The area overlying the left L5-S1 space was cleaned with ChloraPrep solution and draped using standard sterile precautions. Skin was anesthetized with 1% lidocaine. A 5 inch 25-gauge spinal needle was advanced with AP, lateral, oblique fluoroscopy to the left L5-S1 transforaminal space. No paresthesias were induced. 1 cc of Omnipaque was injected demonstrating spread of the dye covering the  L5 and S1  nerve roots as well as in the epidural space. No intravascular spread was noticed. After negative aspiration for blood and CSF, a solution containing 20 mg of dexamethasone and 2 mL of 1% lidocaine was injected without inducing paresthesia or pain. Patient was transferred to recovery in stable condition and subsequently discharged home.         Complications:  None; patient tolerated the procedure well.    Disposition: Home  Condition: stable         Additional Details: NA    Attending Attestation: I performed the procedure.    Cornelius Mesa MD

## 2024-07-08 ENCOUNTER — TREATMENT (OUTPATIENT)
Dept: PHYSICAL THERAPY | Facility: CLINIC | Age: 44
End: 2024-07-08
Payer: COMMERCIAL

## 2024-07-08 DIAGNOSIS — M54.32 LEFT SIDED SCIATICA: Primary | ICD-10-CM

## 2024-07-08 PROCEDURE — 97110 THERAPEUTIC EXERCISES: CPT | Mod: GP | Performed by: PHYSICAL THERAPIST

## 2024-07-08 ASSESSMENT — PAIN - FUNCTIONAL ASSESSMENT: PAIN_FUNCTIONAL_ASSESSMENT: 0-10

## 2024-07-08 ASSESSMENT — PAIN SCALES - GENERAL: PAINLEVEL_OUTOF10: 2

## 2024-07-08 NOTE — PROGRESS NOTES
Physical Therapy    Physical Therapy Treatment    Patient Name: Mayelin Cook  MRN: [id number]  Today's Date: 7/8/2024  Time Calculation  Start Time: 0830  Stop Time: 0915  Time Calculation (min): 45 min     PT Therapeutic Procedures Time Entry  Manual Therapy Time Entry: 5  Therapeutic Exercise Time Entry: 40                 Payor: CARESWAPNIL / Plan: [employer]OURCE / Product Type: *No Product type* /     Reason for Referral: L lumbar radic  General Comment: Visit 2 of 30    Current Problem    Problem List Items Addressed This Visit    None  Visit Diagnoses         Codes    Left sided sciatica    -  Primary M54.32           Subjective   Pt repots she still has trouble sitting. Still notes some symptoms in tot he L LE but feels some sensation increase in L middle toe. Feels the exercises help some thus far.   Compliance with HEP-yes    Precautions  Precautions  Precautions Comment: None    Pain  Pain Assessment: 0-10  0-10 (Numeric) Pain Score: 2    Objective   No measures     Treatments:  There-ex :  TM x 5 min 2.0 mph   Stand HS stretch at step x 1 min ea B LE  Prone lying x 1 min  Prone elbow x 1 min  Press up 2 x 10- pain central only   Prone hip ext 2 x 10 B LE alternating -pain centralized to the knee from the calf   Bridge 2 x 10   Wall slides 2 x 10   Seated posture correction 10 reps   Stand lumbar ext at table 10 x   Reviewed HEP     Manual Tx:  Manual Lumbar traction x 5 min intermittent pull    Informed consent given on manual treatment. Pt given opportunity to cease treatment at any time. Educated pt on expected soreness, possible ecchymosis. Pt voiced understanding  No adverse effects were noted post tx.      Current HEP:  Access Code: [email or web address]  URL: [email or web address]  Date: 07/01/2024  Prepared by: Therese Billings    Exercises  - Prone Off Center Lumbar Spine (Mirrored)  - 4-6 x daily - 7 x weekly - 2-5 minutes hold  - Static Prone on Elbows  - 4-6 x daily - 7 x weekly - 2-5 minutes  hold  - Seated Correct Posture  - 7 x weekly  - Heel Toe Raises with Counter Support  - 1 x daily - 7 x weekly - 2-3 sets - 10 reps  - Supine Sciatic Nerve Glide  - 1 x daily - 7 x weekly - 2-3 sets - 10 reps - 2 seconds hold    Patient Education  - Lumbar Disk Herniation    Assessment:   Pt overall tolerated tx well today and progressed with Bev program with pain remaining central with exercises performed. Recommended to perform extension more throughout the day and continue with HEP.     Plan:   Continue to progress Bev ext program as tolerated.     Goals:  Active       PT Problem       Reduce pain at worst to 3/10 with all functional and recreational activity.        Start:  07/01/24    Expected End:  09/29/24            Increase by > or = 25% without increased pain to perform dressing/bathing/grooming tasks and other function tasks         Start:  07/01/24    Expected End:  09/29/24            Increase by > or = 1/2 mm grade to improve postural awareness and body mechanics to perform daily tasks without increased pain/compensation          Start:  07/01/24    Expected End:  09/29/24            Pt to have decrease in Oswestry by 10% to display increased overall function.        Start:  07/01/24    Expected End:  09/29/24            Patient will demonstrate independence in home program for support of progression       Start:  07/01/24    Expected End:  09/29/24

## 2024-07-09 ENCOUNTER — APPOINTMENT (OUTPATIENT)
Dept: SURGICAL ONCOLOGY | Facility: CLINIC | Age: 44
End: 2024-07-09
Payer: COMMERCIAL

## 2024-07-15 ENCOUNTER — TREATMENT (OUTPATIENT)
Dept: PHYSICAL THERAPY | Facility: CLINIC | Age: 44
End: 2024-07-15
Payer: COMMERCIAL

## 2024-07-15 DIAGNOSIS — M54.32 LEFT SIDED SCIATICA: ICD-10-CM

## 2024-07-15 PROCEDURE — 97110 THERAPEUTIC EXERCISES: CPT | Mod: GP,CQ

## 2024-07-15 ASSESSMENT — PAIN - FUNCTIONAL ASSESSMENT: PAIN_FUNCTIONAL_ASSESSMENT: 0-10

## 2024-07-15 ASSESSMENT — PAIN SCALES - GENERAL: PAINLEVEL_OUTOF10: 8

## 2024-07-15 NOTE — PROGRESS NOTES
Physical Therapy    Physical Therapy Treatment    Patient Name: Mayelin Cook  MRN: [id number]  Today's Date: 7/15/2024  Time Calculation  Start Time: 1705  Stop Time: 1746  Time Calculation (min): 41 min     PT Therapeutic Procedures Time Entry  Therapeutic Exercise Time Entry: 41                 Payor: CARESANDRACHACHA / [facility] / Product Type: *No Product type* /     Reason for Referral: L lumbar radic  General Comment: Visit 3 of 30    Current Problem    Problem List Items Addressed This Visit    None  Visit Diagnoses         Codes    Left sided sciatica     M54.32             Subjective   Pt reports constant pain persists, can't walk. Slept on a air mattress in road kill position all weekend because this is most comfortable   Pain in low back to L LE into calf and 3 toes.  Surgery scheduled 8/9/24 at [facility].   Compliance with HEP-yes    Precautions  Precautions  Precautions Comment: None    Pain  Pain Assessment: 0-10  0-10 (Numeric) Pain Score: 8  Pain Location: Back  Pain Orientation: Right, Left  Pain Radiating Towards: L LE into glute, calf into toes    Objective   No measures     Treatments:  There-ex :  TM x 5 min 2.0 mph  held d/t pain   Stand HS stretch at step x 1 min ea B LE  Prone lying x 1 min  Prone elbow x 1 min  Press up 2 x 10  Prone hip ext 2 x 10 B LE alternating -pain centralized to the knee from the calf added to HEP   Prone L HS curl 2x10   S/L clamshell 2x10 GTB added to HEP   Bridge 2 x 10  added to HEP  Resisted hip flex 3 sec x10   Wall slides 2 x 10   Seated posture correction 10 reps   Stand lumbar ext at table 10 x      Manual Tx:  Manual Lumbar traction x 5 min intermittent pull    Informed consent given on manual treatment. Pt given opportunity to cease treatment at any time. Educated pt on expected soreness, possible ecchymosis. Pt voiced understanding  No adverse effects were noted post tx.      Current HEP:  Access Code: [email or web address]  URL:  [email or web address]  Date: 07/15/2024  Prepared by: Therese Xie    Exercises  - Prone Off Center Lumbar Spine (Mirrored)  - 4-6 x daily - 7 x weekly - 2-5 minutes hold  - Static Prone on Elbows  - 4-6 x daily - 7 x weekly - 2-5 minutes hold  - Seated Correct Posture  - 7 x weekly  - Heel Toe Raises with Counter Support  - 1 x daily - 7 x weekly - 2-3 sets - 10 reps  - Supine Sciatic Nerve Glide  - 1 x daily - 7 x weekly - 2-3 sets - 10 reps - 2 seconds hold  - Clamshell with Resistance  - 1 x daily - 7 x weekly - 2-3 sets - 10 reps  - Supine Bridge  - 1 x daily - 7 x weekly - 2-3 sets - 10 reps  - Prone Hip Extension  - 1 x daily - 7 x weekly - 2-3 sets - 10 reps    Patient Education  - Lumbar Disk Herniation    Assessment:   Anisa extension exercises did diminish pain in L LE but numbness unchanged. Emphasized performing exercises with goal of centralizing/decreasing  L LE pain even if numbness Is unchanged.  Focused on pain free strengthening to prepare for upcoming surgery.  Pt able to tolerate well with cues for compensation. Pt voiced feeling good strengthening so updated HEP.     Plan:   Continue to progress Anisa ext program as tolerated. Progress strengthening as tolerated     Goals:  Active       PT Problem       Reduce pain at worst to 3/10 with all functional and recreational activity.        Start:  07/01/24    Expected End:  09/29/24            Increase by > or = 25% without increased pain to perform dressing/bathing/grooming tasks and other function tasks         Start:  07/01/24    Expected End:  09/29/24            Increase by > or = 1/2 mm grade to improve postural awareness and body mechanics to perform daily tasks without increased pain/compensation          Start:  07/01/24    Expected End:  09/29/24            Pt to have decrease in Oswestry by 10% to display increased overall function.        Start:  07/01/24    Expected End:  09/29/24            Patient will  demonstrate independence in home program for support of progression       Start:  07/01/24    Expected End:  09/29/24

## 2024-07-17 ENCOUNTER — DOCUMENTATION (OUTPATIENT)
Dept: PHYSICAL THERAPY | Facility: CLINIC | Age: 44
End: 2024-07-17
Payer: COMMERCIAL

## 2024-07-17 NOTE — PROGRESS NOTES
Physical Therapy                 Therapy Communication Note    Patient Name: Mayelin Cook  MRN: [id number]  Today's Date: 7/17/2024     Discipline: Physical Therapy    Missed Visit Reason:      Missed Time: No Show    Comment: ROLLY RE: NS. Reminded of next scheduled appt on 7/22. Requested call if she needs to cx or r/s

## 2024-07-22 ENCOUNTER — TREATMENT (OUTPATIENT)
Dept: PHYSICAL THERAPY | Facility: CLINIC | Age: 44
End: 2024-07-22
Payer: COMMERCIAL

## 2024-07-22 DIAGNOSIS — M54.32 LEFT SIDED SCIATICA: Primary | ICD-10-CM

## 2024-07-22 DIAGNOSIS — M54.32 LEFT SIDED SCIATICA: ICD-10-CM

## 2024-07-22 PROCEDURE — 97110 THERAPEUTIC EXERCISES: CPT | Mod: GP,CQ

## 2024-07-22 ASSESSMENT — PAIN - FUNCTIONAL ASSESSMENT: PAIN_FUNCTIONAL_ASSESSMENT: 0-10

## 2024-07-22 ASSESSMENT — PAIN SCALES - GENERAL: PAINLEVEL_OUTOF10: 4

## 2024-07-22 ASSESSMENT — PAIN DESCRIPTION - DESCRIPTORS: DESCRIPTORS: NUMBNESS

## 2024-07-22 NOTE — PROGRESS NOTES
Physical Therapy    Physical Therapy Treatment    Patient Name: Mayelin Cook  MRN: [id number]  Today's Date: 7/22/2024  Time Calculation  Start Time: 1000  Stop Time: 1045  Time Calculation (min): 45 min     PT Therapeutic Procedures Time Entry  Manual Therapy Time Entry: 5  Therapeutic Exercise Time Entry: 40                 Payor: [employer] / Plan: [facility] / Product Type: *No Product type* /     Reason for Referral: L lumbar radic  General Comment: Visit 4 of 30    Current Problem    Problem List Items Addressed This Visit    None  Visit Diagnoses         Codes    Left sided sciatica [M54.32]    -  Primary M54.32               Subjective   Pt reports she walked 3 miles this AM. Today is a good day. Yesterday was best day she had in a long time.   Extension exercises and road kill position help with L LE pain however numbness in L LE to toes is constant.   She has been able to feel her razer when shaving and feels when she pinches calf, which is improvement.  Random sharp, shooting pain in L LE but less frequent.   Surgery rescheduled 8/20/24 at [facility].   Compliance with HEP-yes    Precautions  Precautions  Precautions Comment: None    Pain  Pain Assessment: 0-10  0-10 (Numeric) Pain Score: 4  Pain Location: Back  Pain Orientation: Right, Left  Pain Radiating Towards: L LE glute to 3 toes  Pain Descriptors: Numbness    Objective   No measures     Treatments:  There-ex :  Upright bike L2.5 X 5 min with good posture   Standing calf stretch on fitter board x1 min   Stand HS stretch at step x 1 min ea B LE  Seated piriformis stretch x1 min   Standing hip abd, ext RTB 2x10   DAVID x10, PRN   Wall slides 2x10   Alt fwd lunge with good form 2 x10 2 pulses   Prone lying x 1 min  Press up 2 x 10  Prone L HS curl 2x10   Resisted hip flex 3 sec x10   Seated posture correction 10 reps   Stand lumbar ext at table 10 x      Manual Tx:  Manual Lumbar traction x 5 min intermittent pull  L piriformis release - relief in glute      Informed consent given on manual treatment. Pt given opportunity to cease treatment at any time. Educated pt on expected soreness, possible ecchymosis. Pt voiced understanding  No adverse effects were noted post tx.      Current HEP:  Access Code: [email or web address]  URL: [email or web address]  Date: 07/22/2024  Prepared by: Therese Xie    Exercises  - Prone Off Center Lumbar Spine (Mirrored)  - 4-6 x daily - 7 x weekly - 2-5 minutes hold  - Static Prone on Elbows  - 4-6 x daily - 7 x weekly - 2-5 minutes hold  - Seated Correct Posture  - 7 x weekly  - Heel Toe Raises with Counter Support  - 1 x daily - 7 x weekly - 2-3 sets - 10 reps  - Supine Sciatic Nerve Glide  - 1 x daily - 7 x weekly - 2-3 sets - 10 reps - 2 seconds hold  - Clamshell with Resistance  - 1 x daily - 7 x weekly - 2-3 sets - 10 reps  - Supine Bridge  - 1 x daily - 7 x weekly - 2-3 sets - 10 reps  - Prone Hip Extension  - 1 x daily - 7 x weekly - 2-3 sets - 10 reps  - Hip Extension with Resistance Loop  - 1 x daily - 7 x weekly - 2-3 sets - 10 reps  - Hip Abduction with Resistance Loop  - 1 x daily - 7 x weekly - 2-3 sets - 10 reps    Patient Education  - Lumbar Disk Herniation    Assessment:   Pt responding well to Anisa extension exercises with improvement in L LE pain and slight improvement in numbness.   Progressed exercises to focus on pain free strengthening. Required cues for posture and technique.   Did experience some increased L glute and calf discomfort at end of session.  Piriformis release improved symptoms in LLE.   Pt to cont with extension exercises   Plan:   Continue to progress Anisa ext program as tolerated. Progress strengthening as tolerated   Cont manual tx- piriformis release next session     Goals:  Active       PT Problem       Reduce pain at worst to 3/10 with all functional and recreational activity.        Start:  07/01/24    Expected End:  09/29/24            Increase by > or = 25% without  increased pain to perform dressing/bathing/grooming tasks and other function tasks         Start:  07/01/24    Expected End:  09/29/24            Increase by > or = 1/2 mm grade to improve postural awareness and body mechanics to perform daily tasks without increased pain/compensation          Start:  07/01/24    Expected End:  09/29/24            Pt to have decrease in Oswestry by 10% to display increased overall function.        Start:  07/01/24    Expected End:  09/29/24            Patient will demonstrate independence in home program for support of progression       Start:  07/01/24    Expected End:  09/29/24

## 2024-08-05 ENCOUNTER — TREATMENT (OUTPATIENT)
Dept: PHYSICAL THERAPY | Facility: CLINIC | Age: 44
End: 2024-08-05
Payer: COMMERCIAL

## 2024-08-05 DIAGNOSIS — M54.32 LEFT SIDED SCIATICA: ICD-10-CM

## 2024-08-05 PROCEDURE — 97110 THERAPEUTIC EXERCISES: CPT | Mod: GP,CQ

## 2024-08-05 PROCEDURE — 97140 MANUAL THERAPY 1/> REGIONS: CPT | Mod: GP,CQ

## 2024-08-05 ASSESSMENT — PAIN SCALES - GENERAL: PAINLEVEL_OUTOF10: 6

## 2024-08-05 ASSESSMENT — PAIN - FUNCTIONAL ASSESSMENT: PAIN_FUNCTIONAL_ASSESSMENT: 0-10

## 2024-08-05 NOTE — PROGRESS NOTES
Physical Therapy    Physical Therapy Treatment    Patient Name: Mayelin Cook  MRN: [id number]  Today's Date: 8/5/2024  Time Calculation  Start Time: 1000  Stop Time: 1045  Time Calculation (min): 45 min     PT Therapeutic Procedures Time Entry  Manual Therapy Time Entry: 10  Therapeutic Exercise Time Entry: 35                 Payor: [employer] / Plan: [employer] / Product Type: *No Product type* /     Reason for Referral: L lumbar radic  General Comment: Visit 5 of 30    Current Problem    Problem List Items Addressed This Visit    None  Visit Diagnoses         Codes    Left sided sciatica     M54.32            Subjective   Pt reports good and bad days in regards to pain.   Has to take tylenol/motrin for pain daily, muscle relaxer PRN which helps.   Numbness in leg has diminished, can feel touch now. Numbness in toes persist.   Pt reports she had a breast surgery done so she has not been as active. Still recovering from that has appt on 8/8.  Random sharp, shooting pain in L LE, burning in calf persists but overall less frequent.   Looking forward to surgery on  8/20/24 at [facility].   Compliance with HEP-yes    Precautions  Precautions  Precautions Comment: None    Pain  Pain Assessment: 0-10  0-10 (Numeric) Pain Score: 6  Pain Location: Back  Pain Orientation: Right, Left  Pain Radiating Towards: L LE glute, 3 toes    Objective   No measures     Treatments:  There-ex :  Upright bike L3 X 5 min with good posture   Standing calf stretch on fitter board x1 min   Stand HS stretch at step x 1 min ea B LE  Seated piriformis stretch x1 min   DAVID x10, PRN - shooting and burning in L LE  so ceased   Wall slides 2x10 3 sec hold   Rows BTB 2x10 , DLS   Pull downs BTB 2x10 , DLS (legs staggered, L in front)  Seated lean backs x4 burning in leg so discontinued   Alt fwd lunge with good form 2 x10 2 pulses   Lateral walking 20 ft x 4 pain when walking to R , no resistance    Monster walk 20 ft x 4 no resistance     Prone lying x 1  min  Press up 2 x 10  Prone L HS curl 2x10   Resisted hip flex 3 sec x10      Manual Tx:  Manual Lumbar traction x 5 min intermittent pull  L piriformis release - relief in glute     Informed consent given on manual treatment. Pt given opportunity to cease treatment at any time. Educated pt on expected soreness, possible ecchymosis. Pt voiced understanding  No adverse effects were noted post tx.      Current HEP:  Access Code: [email or web address]  URL: [email or web address]  Date: 07/22/2024  Prepared by: Therese Xie    Exercises  - Prone Off Center Lumbar Spine (Mirrored)  - 4-6 x daily - 7 x weekly - 2-5 minutes hold  - Static Prone on Elbows  - 4-6 x daily - 7 x weekly - 2-5 minutes hold  - Seated Correct Posture  - 7 x weekly  - Heel Toe Raises with Counter Support  - 1 x daily - 7 x weekly - 2-3 sets - 10 reps  - Supine Sciatic Nerve Glide  - 1 x daily - 7 x weekly - 2-3 sets - 10 reps - 2 seconds hold  - Clamshell with Resistance  - 1 x daily - 7 x weekly - 2-3 sets - 10 reps  - Supine Bridge  - 1 x daily - 7 x weekly - 2-3 sets - 10 reps  - Prone Hip Extension  - 1 x daily - 7 x weekly - 2-3 sets - 10 reps  - Hip Extension with Resistance Loop  - 1 x daily - 7 x weekly - 2-3 sets - 10 reps  - Hip Abduction with Resistance Loop  - 1 x daily - 7 x weekly - 2-3 sets - 10 reps    Patient Education  - Lumbar Disk Herniation    Assessment:   Radicular pain increased with DAVID today so discontinued. Pt states sometimes it helps, sometimes it makes it worse. Symptoms and response to exercises sensitive and sometimes inconsistent with exercises.   Pt did experience L burning in calf throughout tx but modified as needed.   Responds well to manual tx.   Plan:   Continue to progress Bev ext program as tolerated. Progress strengthening as tolerated   Cont manual tx- piriformis release next session     Goals:  Active       PT Problem       Reduce pain at worst to 3/10 with all functional and  recreational activity.        Start:  07/01/24    Expected End:  09/29/24            Increase by > or = 25% without increased pain to perform dressing/bathing/grooming tasks and other function tasks         Start:  07/01/24    Expected End:  09/29/24            Increase by > or = 1/2 mm grade to improve postural awareness and body mechanics to perform daily tasks without increased pain/compensation          Start:  07/01/24    Expected End:  09/29/24            Pt to have decrease in Oswestry by 10% to display increased overall function.        Start:  07/01/24    Expected End:  09/29/24            Patient will demonstrate independence in home program for support of progression       Start:  07/01/24    Expected End:  09/29/24

## 2024-08-07 ENCOUNTER — APPOINTMENT (OUTPATIENT)
Dept: PHYSICAL THERAPY | Facility: CLINIC | Age: 44
End: 2024-08-07
Payer: COMMERCIAL

## 2024-08-12 ENCOUNTER — APPOINTMENT (OUTPATIENT)
Dept: PHYSICAL THERAPY | Facility: CLINIC | Age: 44
End: 2024-08-12
Payer: COMMERCIAL

## 2024-08-13 ENCOUNTER — APPOINTMENT (OUTPATIENT)
Dept: PAIN MEDICINE | Facility: CLINIC | Age: 44
End: 2024-08-13
Payer: COMMERCIAL

## 2024-08-14 ENCOUNTER — APPOINTMENT (OUTPATIENT)
Dept: PHYSICAL THERAPY | Facility: CLINIC | Age: 44
End: 2024-08-14
Payer: COMMERCIAL